# Patient Record
Sex: MALE | Race: ASIAN | NOT HISPANIC OR LATINO | Employment: OTHER | URBAN - METROPOLITAN AREA
[De-identification: names, ages, dates, MRNs, and addresses within clinical notes are randomized per-mention and may not be internally consistent; named-entity substitution may affect disease eponyms.]

---

## 2018-06-04 ENCOUNTER — OFFICE VISIT (OUTPATIENT)
Dept: PEDIATRICS | Facility: CLINIC | Age: 28
End: 2018-06-04
Payer: COMMERCIAL

## 2018-06-04 VITALS
OXYGEN SATURATION: 96 % | BODY MASS INDEX: 28.9 KG/M2 | DIASTOLIC BLOOD PRESSURE: 76 MMHG | TEMPERATURE: 97.5 F | HEIGHT: 68 IN | WEIGHT: 190.7 LBS | SYSTOLIC BLOOD PRESSURE: 110 MMHG | HEART RATE: 94 BPM

## 2018-06-04 DIAGNOSIS — H61.22 IMPACTED CERUMEN OF LEFT EAR: Primary | ICD-10-CM

## 2018-06-04 PROCEDURE — 99203 OFFICE O/P NEW LOW 30 MIN: CPT | Performed by: PHYSICIAN ASSISTANT

## 2018-06-04 NOTE — PROGRESS NOTES
"  SUBJECTIVE:   Andreas Araujo is a 28 year old male who presents to clinic today for the following health issues:    Was cleaning ear with a q-tip and ear immediatly felt plugged. States he can not hear out of left ear. Has since tried peroxide.     No ear pain/discomfort. No fevers, chills. No nasal congestion or history of allergies.     ROS:  ROS otherwise negative    OBJECTIVE:                                                    /76 (BP Location: Right arm, Cuff Size: Adult Regular)  Pulse 94  Temp 97.5  F (36.4  C) (Oral)  Ht 5' 8\" (1.727 m)  Wt 190 lb 11.2 oz (86.5 kg)  SpO2 96%  BMI 29 kg/m2  Body mass index is 29 kg/(m^2).   GENERAL: alert, no distress  HENT: ear canals- cerumen impaction on left; wnl on right; Nose- normal; Mouth- no ulcers, no lesions  NECK: no tenderness, no adenopathy  Diagnostic test results:  No results found for this or any previous visit (from the past 24 hour(s)).     ASSESSMENT/PLAN:                                                    (H61.22) Impacted cerumen of left ear  (primary encounter diagnosis)  Comment: removed with success by ear irrigation.   Plan:     Mike Quiñones PA-C  [facility] [place]  "

## 2018-06-04 NOTE — MR AVS SNAPSHOT
"              After Visit Summary   6/4/2018    Andreas Araujo    MRN: [id number]           Patient Information     Date Of Birth          1990        Visit Information        Provider Department      6/4/2018 1:10 PM Mike Quiñones PA-C [facility]        Today's Diagnoses     Impacted cerumen of left ear    -  1       Follow-ups after your visit        Who to contact     If you have questions or need follow up information about today's clinic visit or your schedule please contact [facility] directly at [phone].  Normal or non-critical lab and imaging results will be communicated to you by MyChart, letter or phone within 4 business days after the clinic has received the results. If you do not hear from us within 7 days, please contact the clinic through MyChart or phone. If you have a critical or abnormal lab result, we will notify you by phone as soon as possible.  Submit refill requests through [employer] or call your pharmacy and they will forward the refill request to us. Please allow 3 business days for your refill to be completed.          Additional Information About Your Visit        Care EveryWhere ID     This is your Care EveryWhere ID. This could be used by other organizations to access your [facility] medical records  [id number]        Your Vitals Were     Pulse Temperature Height Pulse Oximetry BMI (Body Mass Index)       94 97.5  F (36.4  C) (Oral) 5' 8\" (1.727 m) 96% 29 kg/m2        Blood Pressure from Last 3 Encounters:   06/04/18 110/76   08/13/16 109/52   07/25/03 112/68    Weight from Last 3 Encounters:   06/04/18 190 lb 11.2 oz (86.5 kg)   07/25/03 114 lb (51.7 kg) (82 %)*   06/17/03 112 lb (50.8 kg) (82 %)*     * Growth percentiles are based on CDC 2-20 Years data.              Today, you had the following     No orders found for display       Primary Care Provider Office Phone # Fax #    [facility] [phone] " [facility] [address]        Equal Access to Services     [facility] CAROLYN : Hadii aad ku haddawsonmary ellen [place], waviancada otto, qawesta curlymaclarke tapia. So [place] [phone].    ATENCIÓN: Si habla español, tiene a musa disposición servicios gratuitos de asistencia lingüística. [facility] [phone].    We comply with applicable federal civil rights laws and [place] laws. We do not discriminate on the basis of race, color, national origin, age, disability, sex, sexual orientation, or gender identity.            Thank you!     Thank you for choosing [facility]  for your care. Our goal is always to provide you with excellent care. Hearing back from our patients is one way we can continue to improve our services. Please take a few minutes to complete the written survey that you may receive in the mail after your visit with us. Thank you!             Your Updated Medication List - Protect others around you: Learn how to safely use, store and throw away your medicines at www.disposemymeds.org.          This list is accurate as of 6/4/18  1:54 PM.  Always use your most recent med list.                   Brand Name Dispense Instructions for use Diagnosis    ADVAIR DISKUS 250-50 MCG/DOSE diskus inhaler   Generic drug:  fluticasone-salmeterol     1    i inhalation bid        albuterol 90 MCG/ACT inhaler     1    1-2 puffs Q 4-6 hrs prn

## 2019-09-19 ENCOUNTER — OFFICE VISIT (OUTPATIENT)
Dept: PEDIATRICS | Facility: CLINIC | Age: 29
End: 2019-09-19
Payer: COMMERCIAL

## 2019-09-19 VITALS
DIASTOLIC BLOOD PRESSURE: 80 MMHG | HEART RATE: 83 BPM | BODY MASS INDEX: 28.11 KG/M2 | OXYGEN SATURATION: 94 % | WEIGHT: 189.8 LBS | SYSTOLIC BLOOD PRESSURE: 108 MMHG | TEMPERATURE: 97.8 F | HEIGHT: 69 IN | RESPIRATION RATE: 18 BRPM

## 2019-09-19 DIAGNOSIS — R73.03 PRE-DIABETES: Primary | ICD-10-CM

## 2019-09-19 DIAGNOSIS — Z86.018 HISTORY OF PROLACTINOMA: ICD-10-CM

## 2019-09-19 DIAGNOSIS — K76.0 NAFLD (NONALCOHOLIC FATTY LIVER DISEASE): ICD-10-CM

## 2019-09-19 LAB
HBA1C MFR BLD: 5.6 % (ref 0–5.6)
PROLACTIN SERPL-MCNC: 5 UG/L (ref 2–18)

## 2019-09-19 PROCEDURE — 99214 OFFICE O/P EST MOD 30 MIN: CPT | Mod: GC | Performed by: STUDENT IN AN ORGANIZED HEALTH CARE EDUCATION/TRAINING PROGRAM

## 2019-09-19 PROCEDURE — 83036 HEMOGLOBIN GLYCOSYLATED A1C: CPT | Performed by: INTERNAL MEDICINE

## 2019-09-19 PROCEDURE — 84146 ASSAY OF PROLACTIN: CPT | Performed by: INTERNAL MEDICINE

## 2019-09-19 PROCEDURE — 36415 COLL VENOUS BLD VENIPUNCTURE: CPT | Performed by: INTERNAL MEDICINE

## 2019-09-19 PROCEDURE — 80061 LIPID PANEL: CPT | Performed by: INTERNAL MEDICINE

## 2019-09-19 PROCEDURE — 80053 COMPREHEN METABOLIC PANEL: CPT | Performed by: INTERNAL MEDICINE

## 2019-09-19 ASSESSMENT — MIFFLIN-ST. JEOR: SCORE: 1813.37

## 2019-09-19 NOTE — LETTER
"               [facility]  [address]  [place] [address]                  [phone]   September 23, 2019    Andreas Araujo  [address]   [place] [address]      Dear Andreas,    Here is a summary of your recent test results:    I am managing results for Dr. Hahn while he is out of the office. Excellent news -- your lab tests are much better!     1. Your LDL or \"bad\" cholesterol is just mildly elevated. Often this is genetic, but continuing to work on exercise and eating a diet that is lower in animal fats can help bring this down further.     2. Your liver and kidney function tests are all now in the normal range. Your electrolytes look good too.     3. Your hemoglobin A1c, which screens for diabetes, is now in the normal range. Great work!     4. Your prolactin is also normal. If you continue to have issues with pressure behind your eyes, or this gets worse, please let us know or consider following up with your Endocrinologist to see if you need a repeat brain MRI. It would be ideal to either do this at the same location or have your results sent over if we need to do an MRI so that we can compare the new images to the old ones.     Please let me know if you have any concerns or questions.     Thank you very much for choosing [facility]            Your test results are enclosed.      Best regards,     Phyllis Hermosillo MD   Internal Medicine-Pediatrics                        Results for orders placed or performed in visit on 09/19/19   Comprehensive metabolic panel (BMP + Alb, Alk Phos, ALT, AST, Total. Bili, TP)   Result Value Ref Range    Sodium 138 133 - 144 mmol/L    Potassium 4.1 3.4 - 5.3 mmol/L    Chloride 106 94 - 109 mmol/L    Carbon Dioxide 25 20 - 32 mmol/L    Anion Gap 7 3 - 14 mmol/L    Glucose 84 70 - 99 mg/dL    Urea Nitrogen 16 7 - 30 mg/dL    Creatinine 0.96 0.66 - 1.25 mg/dL    GFR Estimate >90 >60 mL/min/[1.73_m2]    GFR Estimate If Black >90 " >60 mL/min/[1.73_m2]    Calcium 9.1 8.5 - 10.1 mg/dL    Bilirubin Total 0.4 0.2 - 1.3 mg/dL    Albumin 4.1 3.4 - 5.0 g/dL    Protein Total 7.9 6.8 - 8.8 g/dL    Alkaline Phosphatase 104 40 - 150 U/L    ALT 53 0 - 70 U/L    AST 25 0 - 45 U/L   Lipid panel reflex to direct LDL Fasting   Result Value Ref Range    Cholesterol 156 <200 mg/dL    Triglycerides 88 <150 mg/dL    HDL Cholesterol 28 (L) >39 mg/dL    LDL Cholesterol Calculated 110 (H) <100 mg/dL    Non HDL Cholesterol 128 <130 mg/dL   Hemoglobin A1c   Result Value Ref Range    Hemoglobin A1C 5.6 0 - 5.6 %   Prolactin   Result Value Ref Range    Prolactin 5 2 - 18 ug/L

## 2019-09-19 NOTE — PATIENT INSTRUCTIONS
Avoid high starch foods - rice, breads, pasta etc. To help lower triglycerides  Continue to make all your own meals using fresh food as possible  Aim for 5 servings of vegetables a day  If your eye pressure does not improve, or you develop symptoms of breast swelling/milky discharge, or persistent ED follow up with the endocrinologist you saw before in [place]

## 2019-09-19 NOTE — PROGRESS NOTES
Subjective      Patient is a 29-year-old male with past medical history notable for adolescent asthma currently not on any medications well-controlled, as well as history of prolactinoma previously managed on dopaminergic agents, not currently on medications who is coming in for metabolic abnormalities noted on lab work taken at an outside facility due to application for life insurance in January 2019.    Patient brought in lab work which is notable for a fasting glucose of 106, a hemoglobin A1c of 6.3, ALT in the 60s, with AST in the 30s, as well as GGT in the 60s.  Fasting lipids were notable for triglycerides elevated to the 190s, as well as .  Creatinine was 1.0.    He otherwise endorses a morning posterior eye pressure that he experiences upon waking, similar to what he experienced when he was last diagnosed with a prolactinoma.  However at that time he was suffering from erectile dysfunction, which he has not had this time.  He is able to have morning erections.  And is usually able to maintain erection during sexual intercourse.  Does note that on stressful days he is unable to.  He has not had any changes to vision, photophobia, peripheral field cuts, focal neurological defects, fever sweats or chills.    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    Since receiving the lab work noted above he has increased his exercise to 2 miles of walking 4-5 times a week on a treadmill with a 15% grade, as well as the addition of resistance training using weights.      No known hx of family of cardiovascular disease, nor diabetes or oncologic history.      There is no problem list on file for this patient.    Past Surgical History:   Procedure Laterality Date     C NONSPECIFIC PROCEDURE      oral  "surgery       Social History     Tobacco Use     Smoking status: Never Smoker     Smokeless tobacco: Never Used     Tobacco comment: no second hand smoke exposure   Substance Use Topics     Alcohol use: Yes     Comment: \" i drink a few here and there\"     Family History   Problem Relation Age of Onset     Family History Negative No family hx of          Reviewed and updated as needed this visit by Provider         Review of Systems   ROS COMP: CONSTITUTIONAL: NEGATIVE for fever, chills, change in weight  INTEGUMENTARY/SKIN: NEGATIVE for worrisome rashes, moles or lesions  EYES:  SEE HPI  ENT/MOUTH: NEGATIVE for ear, mouth and throat problems  RESP: NEGATIVE for significant cough or SOB  BREAST: NEGATIVE for masses, tenderness or discharge  CV: NEGATIVE for chest pain, palpitations or peripheral edema  GI: NEGATIVE for nausea, abdominal pain, heartburn, or change in bowel habits  : NEGATIVE for frequency, dysuria, or hematuria  MUSCULOSKELETAL: NEGATIVE for significant arthralgias or myalgia  NEURO: NEGATIVE for weakness, dizziness or paresthesias  HEME: NEGATIVE for bleeding problems  PSYCHIATRIC: NEGATIVE for changes in mood or affect      Objective    /80   Pulse 83   Temp 97.8  F (36.6  C) (Oral)   Resp 18   Ht 1.74 m (5' 8.5\")   Wt 86.1 kg (189 lb 12.8 oz)   SpO2 94%   BMI 28.44 kg/m    Body mass index is 28.44 kg/m .  Physical Exam   GENERAL: healthy, alert and no distress  EYES: Visual fields intact to confrontation, extraocular eye movements grossly intact, however on conjugate downward gaze patient demonstrates a right eye drift out from midline, this corrects with occlusion of left gaze.  Pupils are equal and reactive to light.  On exam no clear appreciation of papilledema however this exam is limited by providers proficiency  HENT: ear canals and TM's normal, nose and mouth without ulcers or lesions  NECK: no adenopathy, no asymmetry, masses, or scars and thyroid normal to palpation  RESP: " lungs clear to auscultation - no rales, rhonchi or wheezes  CV: regular rate and rhythm, normal S1 S2, no S3 or S4, no murmur, click or rub, no peripheral edema and peripheral pulses strong  ABDOMEN: soft, nontender, no hepatosplenomegaly, no masses and bowel sounds normal  MS: no gross musculoskeletal defects noted, no edema  SKIN: no suspicious lesions or rashes  NEURO: Normal strength and tone, mentation intact and speech normal  PSYCH: mentation appears normal, affect normal/bright    Diagnostic Test Results:  Pending        Assessment & Plan     (R73.03) Pre-diabetes  (primary encounter diagnosis)  Comment: Patient presents with outside lab work consistent with a prediabetic picture including a fasting blood glucose of 106 within the range of 100-1 25, as well as a hemoglobin A1c of 6.3.  Patient notes that he has changed his diet since then, and given his young age as well as reported dietary changes with rechecking these values at this time.  Fortunately patient is fasting today.  Patient was counseled on exercise and dietary habits to help with this.  Hope to avoid any need for medication at this time.  However this problem is consistent with this further issues on lab work consistent with the metabolic derangement concerning for possible metabolic syndrome.    Plan:    Comprehensive metabolic panel (BMP + Alb, Alk Phos, ALT, AST, Total. Bili, TP),    Lipid panel  reflex to direct LDL Fasting,    Hemoglobin A1c        -If continues to have elevated hemoglobin A1c and fasting glucose following dietary interventions consider further counseling, possibly meeting with diabetic educator, encouraging further alcohol cessation, and still unsuccessful considering obtaining baseline UA (however patient's creatinine last known was normal) and if very necessary consider starting medications    (K76.0) NAFLD (nonalcoholic fatty liver disease)/DURON (nonalcoholic steatohepatitis)  Comment: Patient's hepatic profile of ALT  "twice that of AST with GGT elevation is consistent with Crocker/Naftel, given his young age and assumed relatively early progression of such a disease hope for possible reversal of this given lifestyle and dietary changes.  Also given his elevated triglycerides increased risk for this.  Patient encouraged to stay away from starches and rice, as well as dietary and exercise changes made above.  No known family history of liver disease, patient does not endorse any abdominal symptoms, and after testing demonstrated that he was hep B and hep C negative.  Patient does not endorse significant Tylenol use.    Plan: Comprehensive metabolic panel (BMP + Alb, Alk Phos, ALT, AST, Total. Bili, TP),    Lipid panel  reflex to direct LDL Fasting,        - dietary and lifestyle changes    (Z86.018) History of prolactinoma  Comment: Patient reports a history of a prolactinoma, which which he previously saw an endocrinologist for, and was managed on cabergoline with resolution of the symptoms most notably erectile dysfunction.  Patient does report that his eye pressures may be similar to what he had felt before, however is not endorsing symptoms of erectile dysfunction, gynecomastia, galactorrhea, testicular shrinkage, nor vision changes.  Is recommended the patient monitor symptoms and reach out to his prior endocrinologist as they have a history of his previous MRI scans to further assess if there is any changes.  [place] obtaining a prolactin level at this time.    Plan: Prolactin        - potential f/u with his prior endocrinologist should prolactin return elevated or he develop new symptoms consistent with prolactinoma (pt was educated on these symptoms)       BMI:   Estimated body mass index is 28.44 kg/m  as calculated from the following:    Height as of this encounter: 1.74 m (5' 8.5\").    Weight as of this encounter: 86.1 kg (189 lb 12.8 oz).   Weight management plan: Discussed healthy diet and exercise guidelines    Cale " Frandy Hahn MD  [facility] [place]    I have seen and discussed the patient with Dr. Hahn and agree with the jointly developed plan as documented above. Repeating labs as above, if prolactin normal and headaches persist would recommend that he follow-up with Endocrinologist or us for head MRI to make sure that he does not have a growing non-active pituitary mass. If prolactin elevated, he will need to follow-up with Endo.     Phyllis Hermosillo MD  Internal Medicine-Pediatrics

## 2019-09-20 LAB
ALBUMIN SERPL-MCNC: 4.1 G/DL (ref 3.4–5)
ALP SERPL-CCNC: 104 U/L (ref 40–150)
ALT SERPL W P-5'-P-CCNC: 53 U/L (ref 0–70)
ANION GAP SERPL CALCULATED.3IONS-SCNC: 7 MMOL/L (ref 3–14)
AST SERPL W P-5'-P-CCNC: 25 U/L (ref 0–45)
BILIRUB SERPL-MCNC: 0.4 MG/DL (ref 0.2–1.3)
BUN SERPL-MCNC: 16 MG/DL (ref 7–30)
CALCIUM SERPL-MCNC: 9.1 MG/DL (ref 8.5–10.1)
CHLORIDE SERPL-SCNC: 106 MMOL/L (ref 94–109)
CHOLEST SERPL-MCNC: 156 MG/DL
CO2 SERPL-SCNC: 25 MMOL/L (ref 20–32)
CREAT SERPL-MCNC: 0.96 MG/DL (ref 0.66–1.25)
GFR SERPL CREATININE-BSD FRML MDRD: >90 ML/MIN/{1.73_M2}
GLUCOSE SERPL-MCNC: 84 MG/DL (ref 70–99)
HDLC SERPL-MCNC: 28 MG/DL
LDLC SERPL CALC-MCNC: 110 MG/DL
NONHDLC SERPL-MCNC: 128 MG/DL
POTASSIUM SERPL-SCNC: 4.1 MMOL/L (ref 3.4–5.3)
PROT SERPL-MCNC: 7.9 G/DL (ref 6.8–8.8)
SODIUM SERPL-SCNC: 138 MMOL/L (ref 133–144)
TRIGL SERPL-MCNC: 88 MG/DL

## 2021-03-14 ENCOUNTER — HEALTH MAINTENANCE LETTER (OUTPATIENT)
Age: 31
End: 2021-03-14

## 2021-03-22 ENCOUNTER — IMMUNIZATION (OUTPATIENT)
Dept: NURSING | Facility: CLINIC | Age: 31
End: 2021-03-22
Payer: COMMERCIAL

## 2021-03-22 PROCEDURE — 91300 PR COVID VAC PFIZER DIL RECON 30 MCG/0.3 ML IM: CPT

## 2021-03-22 PROCEDURE — 0001A PR COVID VAC PFIZER DIL RECON 30 MCG/0.3 ML IM: CPT

## 2021-04-12 ENCOUNTER — IMMUNIZATION (OUTPATIENT)
Dept: NURSING | Facility: CLINIC | Age: 31
End: 2021-04-12
Attending: FAMILY MEDICINE
Payer: COMMERCIAL

## 2021-04-12 PROCEDURE — 91300 PR COVID VAC PFIZER DIL RECON 30 MCG/0.3 ML IM: CPT

## 2021-04-12 PROCEDURE — 0002A PR COVID VAC PFIZER DIL RECON 30 MCG/0.3 ML IM: CPT

## 2021-10-23 ENCOUNTER — HEALTH MAINTENANCE LETTER (OUTPATIENT)
Age: 31
End: 2021-10-23

## 2022-04-09 ENCOUNTER — HEALTH MAINTENANCE LETTER (OUTPATIENT)
Age: 32
End: 2022-04-09

## 2022-10-10 ENCOUNTER — HEALTH MAINTENANCE LETTER (OUTPATIENT)
Age: 32
End: 2022-10-10

## 2023-05-27 ENCOUNTER — HEALTH MAINTENANCE LETTER (OUTPATIENT)
Age: 33
End: 2023-05-27

## 2023-05-29 ASSESSMENT — ENCOUNTER SYMPTOMS
NAUSEA: 0
HEMATURIA: 0
CHILLS: 0
DIZZINESS: 0
SHORTNESS OF BREATH: 0
FEVER: 0
COUGH: 0
HEMATOCHEZIA: 0
MYALGIAS: 0
CONSTIPATION: 0
JOINT SWELLING: 0
WEAKNESS: 0
ARTHRALGIAS: 0
DIARRHEA: 0
SORE THROAT: 0
HEARTBURN: 0
FREQUENCY: 0
EYE PAIN: 0
DYSURIA: 0
HEADACHES: 0
PARESTHESIAS: 0
NERVOUS/ANXIOUS: 0
ABDOMINAL PAIN: 0
PALPITATIONS: 0

## 2023-06-05 ENCOUNTER — OFFICE VISIT (OUTPATIENT)
Dept: INTERNAL MEDICINE | Facility: CLINIC | Age: 33
End: 2023-06-05
Payer: COMMERCIAL

## 2023-06-05 VITALS
OXYGEN SATURATION: 100 % | SYSTOLIC BLOOD PRESSURE: 122 MMHG | TEMPERATURE: 97.4 F | HEIGHT: 69 IN | DIASTOLIC BLOOD PRESSURE: 85 MMHG | WEIGHT: 188.7 LBS | HEART RATE: 78 BPM | BODY MASS INDEX: 27.95 KG/M2

## 2023-06-05 DIAGNOSIS — Z86.39 HISTORY OF HYPERPROLACTINEMIA: ICD-10-CM

## 2023-06-05 DIAGNOSIS — N52.9 ERECTILE DYSFUNCTION, UNSPECIFIED ERECTILE DYSFUNCTION TYPE: ICD-10-CM

## 2023-06-05 DIAGNOSIS — R73.03 PRE-DIABETES: ICD-10-CM

## 2023-06-05 DIAGNOSIS — R06.83 SNORING: ICD-10-CM

## 2023-06-05 DIAGNOSIS — Z11.4 SCREENING FOR HIV (HUMAN IMMUNODEFICIENCY VIRUS): ICD-10-CM

## 2023-06-05 DIAGNOSIS — R39.9 LOWER URINARY TRACT SYMPTOMS: ICD-10-CM

## 2023-06-05 DIAGNOSIS — Z11.59 NEED FOR HEPATITIS C SCREENING TEST: ICD-10-CM

## 2023-06-05 DIAGNOSIS — Z00.00 ROUTINE GENERAL MEDICAL EXAMINATION AT A HEALTH CARE FACILITY: Primary | ICD-10-CM

## 2023-06-05 DIAGNOSIS — Z13.220 LIPID SCREENING: ICD-10-CM

## 2023-06-05 LAB
ALBUMIN SERPL BCG-MCNC: 4.8 G/DL (ref 3.5–5.2)
ALBUMIN UR-MCNC: NEGATIVE MG/DL
ALP SERPL-CCNC: 92 U/L (ref 40–129)
ALT SERPL W P-5'-P-CCNC: 43 U/L (ref 10–50)
ANION GAP SERPL CALCULATED.3IONS-SCNC: 11 MMOL/L (ref 7–15)
APPEARANCE UR: CLEAR
AST SERPL W P-5'-P-CCNC: 40 U/L (ref 10–50)
BACTERIA #/AREA URNS HPF: NORMAL /HPF
BILIRUB SERPL-MCNC: 0.4 MG/DL
BILIRUB UR QL STRIP: NEGATIVE
BUN SERPL-MCNC: 14.2 MG/DL (ref 6–20)
CALCIUM SERPL-MCNC: 9.8 MG/DL (ref 8.6–10)
CHLORIDE SERPL-SCNC: 103 MMOL/L (ref 98–107)
COLOR UR AUTO: YELLOW
CREAT SERPL-MCNC: 0.99 MG/DL (ref 0.67–1.17)
DEPRECATED HCO3 PLAS-SCNC: 24 MMOL/L (ref 22–29)
ERYTHROCYTE [DISTWIDTH] IN BLOOD BY AUTOMATED COUNT: 11.8 % (ref 10–15)
GFR SERPL CREATININE-BSD FRML MDRD: >90 ML/MIN/1.73M2
GLUCOSE SERPL-MCNC: 112 MG/DL (ref 70–99)
GLUCOSE UR STRIP-MCNC: NEGATIVE MG/DL
HBA1C MFR BLD: 5.7 % (ref 0–5.6)
HCT VFR BLD AUTO: 46.3 % (ref 40–53)
HCV AB SERPL QL IA: NONREACTIVE
HGB BLD-MCNC: 15.4 G/DL (ref 13.3–17.7)
HGB UR QL STRIP: NEGATIVE
HIV 1+2 AB+HIV1 P24 AG SERPL QL IA: NONREACTIVE
KETONES UR STRIP-MCNC: NEGATIVE MG/DL
LEUKOCYTE ESTERASE UR QL STRIP: NEGATIVE
MCH RBC QN AUTO: 29.9 PG (ref 26.5–33)
MCHC RBC AUTO-ENTMCNC: 33.3 G/DL (ref 31.5–36.5)
MCV RBC AUTO: 90 FL (ref 78–100)
NITRATE UR QL: NEGATIVE
PH UR STRIP: 6 [PH] (ref 5–7)
PLATELET # BLD AUTO: 292 10E3/UL (ref 150–450)
POTASSIUM SERPL-SCNC: 4.4 MMOL/L (ref 3.4–5.3)
PROLACTIN SERPL 3RD IS-MCNC: 18 NG/ML (ref 4–15)
PROT SERPL-MCNC: 8.1 G/DL (ref 6.4–8.3)
RBC # BLD AUTO: 5.15 10E6/UL (ref 4.4–5.9)
RBC #/AREA URNS AUTO: NORMAL /HPF
SHBG SERPL-SCNC: 9 NMOL/L (ref 11–80)
SODIUM SERPL-SCNC: 138 MMOL/L (ref 136–145)
SP GR UR STRIP: 1.01 (ref 1–1.03)
UROBILINOGEN UR STRIP-ACNC: 0.2 E.U./DL
WBC # BLD AUTO: 8.5 10E3/UL (ref 4–11)
WBC #/AREA URNS AUTO: NORMAL /HPF

## 2023-06-05 PROCEDURE — 99213 OFFICE O/P EST LOW 20 MIN: CPT | Mod: 25

## 2023-06-05 PROCEDURE — 36415 COLL VENOUS BLD VENIPUNCTURE: CPT

## 2023-06-05 PROCEDURE — 84403 ASSAY OF TOTAL TESTOSTERONE: CPT

## 2023-06-05 PROCEDURE — 81001 URINALYSIS AUTO W/SCOPE: CPT

## 2023-06-05 PROCEDURE — 99385 PREV VISIT NEW AGE 18-39: CPT | Mod: 25

## 2023-06-05 PROCEDURE — 90715 TDAP VACCINE 7 YRS/> IM: CPT

## 2023-06-05 PROCEDURE — 84270 ASSAY OF SEX HORMONE GLOBUL: CPT

## 2023-06-05 PROCEDURE — 84146 ASSAY OF PROLACTIN: CPT

## 2023-06-05 PROCEDURE — 87389 HIV-1 AG W/HIV-1&-2 AB AG IA: CPT

## 2023-06-05 PROCEDURE — 90471 IMMUNIZATION ADMIN: CPT

## 2023-06-05 PROCEDURE — 80053 COMPREHEN METABOLIC PANEL: CPT

## 2023-06-05 PROCEDURE — 85027 COMPLETE CBC AUTOMATED: CPT

## 2023-06-05 PROCEDURE — 86803 HEPATITIS C AB TEST: CPT

## 2023-06-05 PROCEDURE — 83036 HEMOGLOBIN GLYCOSYLATED A1C: CPT

## 2023-06-05 ASSESSMENT — ENCOUNTER SYMPTOMS
CHILLS: 0
ABDOMINAL PAIN: 0
HEARTBURN: 0
NAUSEA: 0
HEADACHES: 0
SORE THROAT: 0
NERVOUS/ANXIOUS: 0
PALPITATIONS: 0
ARTHRALGIAS: 0
JOINT SWELLING: 0
HEMATURIA: 0
WEAKNESS: 0
DIARRHEA: 0
FREQUENCY: 0
CONSTIPATION: 0
FEVER: 0
DIZZINESS: 0
MYALGIAS: 0
DYSURIA: 0
PARESTHESIAS: 0
SHORTNESS OF BREATH: 0
HEMATOCHEZIA: 0
COUGH: 0
EYE PAIN: 0

## 2023-06-05 NOTE — PROGRESS NOTES
SUBJECTIVE:   CC: Andreas is an 33 year old who presents for preventative health visit.       6/5/2023     7:48 AM   Additional Questions   Roomed by Abigail Vo MA   Accompanied by Himself     Healthy Habits:     Getting at least 3 servings of Calcium per day:  NO    Bi-annual eye exam:  NO    Dental care twice a year:  Yes    Sleep apnea or symptoms of sleep apnea:  None    Diet:  Regular (no restrictions) and Carbohydrate counting    Frequency of exercise:  4-5 days/week    Duration of exercise:  15-30 minutes    Taking medications regularly:  Yes    Medication side effects:  Not applicable    PHQ-2 Total Score: 0    Additional concerns today:  Yes    Ability to successfully perform activities of daily living: No, needs assistance with: no assistance needed  Home safety:  none identified   Hearing impairment: No, No concerns      Today's PHQ-2 Score:       6/4/2023    12:24 PM   PHQ-2 ( 1999 [employer])   Q1: Little interest or pleasure in doing things 0   Q2: Feeling down, depressed or hopeless 0   PHQ-2 Score 0   Q1: Little interest or pleasure in doing things Not at all   Q2: Feeling down, depressed or hopeless Not at all   PHQ-2 Score 0       Have you ever done Advance Care Planning? (For example, a Health Directive, POLST, or a discussion with a medical provider or your loved ones about your wishes): No, advance care planning information given to patient to review.  Advanced care planning was discussed at today's visit.    Social History     Tobacco Use     Smoking status: Never     Smokeless tobacco: Never     Tobacco comments:     no second hand smoke exposure   Vaping Use     Vaping status: Never Used   Substance Use Topics     Alcohol use: Yes     Comment: 1-5 times a year           5/29/2023    11:54 AM   Alcohol Use   Prescreen: >3 drinks/day or >7 drinks/week? No          View : No data to display.                Last PSA: No results found for: PSA    Reviewed orders with patient. Reviewed health  maintenance and updated orders accordingly - Yes    Reviewed and updated as needed this visit by clinical staff   Tobacco  Allergies  Meds              Reviewed and updated as needed this visit by Provider                 Past Medical History:   Diagnosis Date     Uncomplicated asthma     As a child     Unspecified asthma(493.90)      Vomiting alone       Past Surgical History:   Procedure Laterality Date     RUST NONSPECIFIC PROCEDURE      oral surgery       Review of Systems   Constitutional: Negative for chills and fever.   HENT: Negative for congestion, ear pain, hearing loss and sore throat.    Eyes: Negative for pain and visual disturbance.   Respiratory: Negative for cough and shortness of breath.    Cardiovascular: Negative for chest pain, palpitations and peripheral edema.   Gastrointestinal: Negative for abdominal pain, constipation, diarrhea, heartburn, hematochezia and nausea.   Genitourinary: Negative for dysuria, frequency, genital sores, hematuria, impotence, penile discharge and urgency.   Musculoskeletal: Negative for arthralgias, joint swelling and myalgias.   Skin: Negative for rash.   Neurological: Negative for dizziness, weakness, headaches and paresthesias.   Psychiatric/Behavioral: Negative for mood changes. The patient is not nervous/anxious.      3 years ago had blood work    Had hyperprolactinemia- was having ED at age 22 and this is how it was found out     When he urinates it is foamy- when he flushes it is fine. He does not have any pain with urination or urinary discharge.    Elevated liver enzymes- never had US     Chiropractor as occupation     Patient's wife has genital herpes.  He states that he has been tested for this before and it was positive.  He states he has also had lesions in the past.  We discussed testing blood for herpes but overall we decided that we would not do it.  Advised patient that if he does have lesions he should come in to clinic to test them and we can use  "antiviral to treat    OBJECTIVE:   /85 (BP Location: Left arm, Patient Position: Sitting, Cuff Size: Adult Large)   Pulse 78   Temp 97.4  F (36.3  C) (Oral)   Ht 1.753 m (5' 9\")   Wt 85.6 kg (188 lb 11.2 oz)   SpO2 100%   BMI 27.87 kg/m      Physical Exam  GENERAL: alert and no distress  EYES: Eyes grossly normal to inspection  HENT: ear canals and TM's normal, nose and mouth without ulcers or lesions  NECK: no adenopathy, no asymmetry, masses, or scars and thyroid normal to palpation  RESP: lungs clear to auscultation - no rales, rhonchi or wheezes  CV: regular rate and rhythm, normal S1 S2, no S3 or S4, no murmur, click or rub, no peripheral edema and peripheral pulses strong  ABDOMEN: soft, nontender, no hepatosplenomegaly, no masses and bowel sounds normal  MS: no gross musculoskeletal defects noted, no edema  SKIN: no suspicious lesions or rashes  NEURO: Normal strength and tone, mentation intact and speech normal  PSYCH: mentation appears normal, affect normal/bright      ASSESSMENT/PLAN:   (Z00.00) Routine general medical examination at a health care facility  (primary encounter diagnosis)  Comment: routine- few additional concerns  Plan: Comprehensive metabolic panel (BMP + Alb, Alk         Phos, ALT, AST, Total. Bili, TP), CBC with         platelets            (Z11.4) Screening for HIV (human immunodeficiency virus)  Comment: screening  Plan: HIV Antigen Antibody Combo            (Z11.59) Need for hepatitis C screening test  Comment: screening  Plan: Hepatitis C Screen Reflex to HCV RNA Quant and         Genotype            (Z13.220) Lipid screening  Comment: screening  Plan:     (R73.03) Pre-diabetes  Comment: A1c was previously in pre-diabetic range per patient report.   Plan: Hemoglobin A1c            (Z86.39) History of hyperprolactinemia  Comment: per patient report. He had also been deficient in testosterone.   Plan: Prolactin, Testosterone Free and Total            (N52.9) Erectile " "dysfunction, unspecified erectile dysfunction type  Comment: has improved from when he was younger. Interested in to continue to monitor  Plan: Testosterone Free and Total            (R39.9) Lower urinary tract symptoms  Comment: patient states that urine seems more foamy that normal. He is concerned that there is protein in his urine. Will check UA  Plan: UA with Microscopic reflex to Culture - lab         collect            (R06.83) Snoring  Comment: Patient states that he snores loudly and his wife says that he has some breathing failure during sleep.  Plan: Adult Sleep Eval & Management          Referral            COUNSELING:   Reviewed preventive health counseling, as reflected in patient instructions      BMI:   Estimated body mass index is 27.87 kg/m  as calculated from the following:    Height as of this encounter: 1.753 m (5' 9\").    Weight as of this encounter: 85.6 kg (188 lb 11.2 oz).       He reports that he has never smoked. He has never used smokeless tobacco.      Matty Garcia NP  [facility]  "

## 2023-06-05 NOTE — NURSING NOTE
Prior to immunization administration, verified patients identity using patient s name and date of birth. Please see Immunization Activity for additional information.     Screening Questionnaire for Adult Immunization    Are you sick today?   No   Do you have allergies to medications, food, a vaccine component or latex?   No   Have you ever had a serious reaction after receiving a vaccination?   No   Do you have a long-term health problem with heart, lung, kidney, or metabolic disease (e.g., diabetes), asthma, a blood disorder, no spleen, complement component deficiency, a cochlear implant, or a spinal fluid leak?  Are you on long-term aspirin therapy?   No   Do you have cancer, leukemia, HIV/AIDS, or any other immune system problem?   No   Do you have a parent, brother, or sister with an immune system problem?   No   In the past 3 months, have you taken medications that affect  your immune system, such as prednisone, other steroids, or anticancer drugs; drugs for the treatment of rheumatoid arthritis, Crohn s disease, or psoriasis; or have you had radiation treatments?   No   Have you had a seizure, or a brain or other nervous system problem?   No   During the past year, have you received a transfusion of blood or blood    products, or been given immune (gamma) globulin or antiviral drug?   No   For women: Are you pregnant or is there a chance you could become       pregnant during the next month?   No   Have you received any vaccinations in the past 4 weeks?   No     Immunization questionnaire answers were all negative.      Injection of TDAP given by Abigail Vo MA. Patient instructed to remain in clinic for 15 minutes afterwards, and to report any adverse reactions.     Screening performed by Abigail Vo MA on 6/5/2023 at 8:36 AM.

## 2023-06-07 ENCOUNTER — MYC MEDICAL ADVICE (OUTPATIENT)
Dept: INTERNAL MEDICINE | Facility: CLINIC | Age: 33
End: 2023-06-07
Payer: COMMERCIAL

## 2023-06-07 DIAGNOSIS — R73.09 ELEVATED GLUCOSE: ICD-10-CM

## 2023-06-07 DIAGNOSIS — Z00.00 ROUTINE GENERAL MEDICAL EXAMINATION AT A HEALTH CARE FACILITY: Primary | ICD-10-CM

## 2023-06-07 LAB
TESTOST FREE SERPL-MCNC: 11.56 NG/DL
TESTOST SERPL-MCNC: 345 NG/DL (ref 240–950)

## 2023-06-08 DIAGNOSIS — Z13.220 LIPID SCREENING: ICD-10-CM

## 2023-06-08 DIAGNOSIS — Z86.39 HISTORY OF HYPERPROLACTINEMIA: ICD-10-CM

## 2023-06-08 DIAGNOSIS — E22.1 HYPERPROLACTINEMIA (H): Primary | ICD-10-CM

## 2023-06-08 NOTE — CONFIDENTIAL NOTE
Please see patient's mychart message below regarding lab results from 6/5/23.    Please advise, thanks.

## 2023-06-09 NOTE — TELEPHONE ENCOUNTER
I did place a referral for endocrine and ask that you recheck labs in the meantime. They should call you to schedule. I added a thyroid and lipid to your new blood draw as well.     Your sex hormone binding hormone was slightly low. From my research causing of this could include mild to moderate obesity, diabetes, steroid use and hypothyroidism    Both your hgb a1c and fasting glucose are in prediabetic range

## 2023-06-14 NOTE — TELEPHONE ENCOUNTER
I can add glucose to upcoming lab test- generally we recheck a1c at 3 months for the earliest, but with where your a1c is 6 months would be appropriate.    I would not consider medication until a1c is >7

## 2023-06-14 NOTE — TELEPHONE ENCOUNTER
Routing to provider. Patient is asking when he will be due for his next glucose check please.    Fabian Rogers, Triage RN [place]  9:17 AM 6/14/2023

## 2023-06-15 NOTE — TELEPHONE ENCOUNTER
Auto [employer] message sent to patient.    Fabian Rogers, Triage RN [place]  8:56 AM 6/15/2023

## 2023-07-03 ENCOUNTER — LAB (OUTPATIENT)
Dept: LAB | Facility: CLINIC | Age: 33
End: 2023-07-03
Payer: COMMERCIAL

## 2023-07-03 DIAGNOSIS — R73.09 ELEVATED GLUCOSE: ICD-10-CM

## 2023-07-03 DIAGNOSIS — E22.1 HYPERPROLACTINEMIA (H): ICD-10-CM

## 2023-07-03 DIAGNOSIS — Z00.00 ROUTINE GENERAL MEDICAL EXAMINATION AT A HEALTH CARE FACILITY: ICD-10-CM

## 2023-07-03 DIAGNOSIS — Z13.220 LIPID SCREENING: ICD-10-CM

## 2023-07-03 LAB
CHOLEST SERPL-MCNC: 224 MG/DL
FASTING STATUS PATIENT QL REPORTED: YES
GLUCOSE SERPL-MCNC: 95 MG/DL (ref 70–99)
HDLC SERPL-MCNC: 35 MG/DL
LDLC SERPL CALC-MCNC: 156 MG/DL
NONHDLC SERPL-MCNC: 189 MG/DL
PROLACTIN SERPL 3RD IS-MCNC: 28 NG/ML (ref 4–15)
TRIGL SERPL-MCNC: 167 MG/DL
TSH SERPL DL<=0.005 MIU/L-ACNC: 1.2 UIU/ML (ref 0.3–4.2)

## 2023-07-03 PROCEDURE — 82947 ASSAY GLUCOSE BLOOD QUANT: CPT

## 2023-07-03 PROCEDURE — 84146 ASSAY OF PROLACTIN: CPT

## 2023-07-03 PROCEDURE — 36415 COLL VENOUS BLD VENIPUNCTURE: CPT

## 2023-07-03 PROCEDURE — 80061 LIPID PANEL: CPT

## 2023-07-03 PROCEDURE — 84443 ASSAY THYROID STIM HORMONE: CPT

## 2023-07-13 ENCOUNTER — DOCUMENTATION ONLY (OUTPATIENT)
Dept: OTHER | Facility: CLINIC | Age: 33
End: 2023-07-13
Payer: COMMERCIAL

## 2023-07-19 ASSESSMENT — SLEEP AND FATIGUE QUESTIONNAIRES
HOW LIKELY ARE YOU TO NOD OFF OR FALL ASLEEP WHEN YOU ARE A PASSENGER IN A CAR FOR AN HOUR WITHOUT A BREAK: WOULD NEVER DOZE
HOW LIKELY ARE YOU TO NOD OFF OR FALL ASLEEP WHILE SITTING QUIETLY AFTER LUNCH WITHOUT ALCOHOL: WOULD NEVER DOZE
HOW LIKELY ARE YOU TO NOD OFF OR FALL ASLEEP WHILE SITTING AND READING: SLIGHT CHANCE OF DOZING
HOW LIKELY ARE YOU TO NOD OFF OR FALL ASLEEP WHILE LYING DOWN TO REST IN THE AFTERNOON WHEN CIRCUMSTANCES PERMIT: MODERATE CHANCE OF DOZING
HOW LIKELY ARE YOU TO NOD OFF OR FALL ASLEEP WHILE SITTING INACTIVE IN A PUBLIC PLACE: WOULD NEVER DOZE
HOW LIKELY ARE YOU TO NOD OFF OR FALL ASLEEP WHILE WATCHING TV: SLIGHT CHANCE OF DOZING
HOW LIKELY ARE YOU TO NOD OFF OR FALL ASLEEP IN A CAR, WHILE STOPPED FOR A FEW MINUTES IN TRAFFIC: WOULD NEVER DOZE
HOW LIKELY ARE YOU TO NOD OFF OR FALL ASLEEP WHILE SITTING AND TALKING TO SOMEONE: WOULD NEVER DOZE

## 2023-07-25 ENCOUNTER — VIRTUAL VISIT (OUTPATIENT)
Dept: SLEEP MEDICINE | Facility: CLINIC | Age: 33
End: 2023-07-25
Payer: COMMERCIAL

## 2023-07-25 VITALS — WEIGHT: 181 LBS | HEIGHT: 68 IN | BODY MASS INDEX: 27.43 KG/M2

## 2023-07-25 DIAGNOSIS — G47.9 DISTURBANCE IN SLEEP BEHAVIOR: ICD-10-CM

## 2023-07-25 DIAGNOSIS — R06.83 SNORING: Primary | ICD-10-CM

## 2023-07-25 DIAGNOSIS — R06.89 DYSPNEA AND RESPIRATORY ABNORMALITY: ICD-10-CM

## 2023-07-25 DIAGNOSIS — R06.00 DYSPNEA AND RESPIRATORY ABNORMALITY: ICD-10-CM

## 2023-07-25 PROCEDURE — 99244 OFF/OP CNSLTJ NEW/EST MOD 40: CPT | Mod: VID | Performed by: INTERNAL MEDICINE

## 2023-07-25 ASSESSMENT — PAIN SCALES - GENERAL: PAINLEVEL: NO PAIN (0)

## 2023-07-25 NOTE — PROGRESS NOTES
Virtual Visit Details    Type of service:  Video Visit   Video Start Time: 2:21 PM  Video End Time:2:52 PM    Originating Location (pt. Location): Home    Distant Location (provider location):  Off-site  Platform used for Video Visit: [employer]

## 2023-07-25 NOTE — PROGRESS NOTES
Outpatient Sleep Medicine Consultation:      Name: Andreas Araujo MRN# [id number]   Age: 33 year old YOB: 1990     Date of Consultation: July 25, 2023  Consultation is requested by: Matty Garcia NP  [address] [place] [address] Matty Garcia  Primary care provider: Matty Garcia       Reason for Sleep Consult:     Andreas Araujo is sent by Matty Garcia for a sleep consultation regarding snoring.    Patient s Reason for visit  Andreas Araujo main reason for visit: wife thinks im not breathing in my sleep, also lately not staying asleep  Patient states problem(s) started: the not breathing 3-6 years, the not rested staying asleep about 2 months  Andreas Araujo's goals for this visit: to get some answers and see what the next step could be           Assessment and Plan:     Socially disruptive snoring, witnessed apnea, nocturia, bruxism  - Patient appears to be a good candidate for Home Sleep Test STOPBANG  3 due to delayed sleep phase  -  If HSAT normal would recommend attended Polysomnogram. If mild obstructive sleep apnea would want CPAP. If moderate-severe would recommend CPAP       Sleep maintenance difficulties (KAYLA 14)  Recent episode, resolved       Summary Counseling:    Sleep Testing Reviewed  Obstructive Sleep Apnea Reviewed  Complications of Untreated Sleep Apnea Reviewed  Treatment options for obstructive sleep apnea reviewed     I spent 25 minutes with patient including counseling, and 10 minutes with chart review, and documentation     CC: Matty Garcia          History of Present Illness:       SLEEP-WAKE SCHEDULE:     Work/School Days: Patient goes to school/work: Yes 10-1 and 3-6 on  M, W, Th  Usually gets into bed at 12am-3am  Takes patient about 5-30 minutes to fall asleep  Has no trouble falling asleep   Wakes up in the middle of the night 2-3 usually at 5-6am and 8:00am - get up time on a work day is 9:15am times.  Wakes up due to Use the bathroom;Uncertain  He has  had trouble falling back asleep  He had a bad 2 weeks when he made the appointment  Usually its  Not a problem   It usually takes 10-15 minutes to get back to sleep  Patient is usually up at work day 9:15 - off day 11:00am  Uses alarm: Yes   due to an over-active mind      Weekends/Non-work Days/All Other Days:  Usually gets into bed at 12:00am - 3:00am   Takes patient about 5-10 minutes to fall asleep  Patient is usually up at 11:00am  Uses alarm: No    Sleep Need  Patient gets  7-9 hours sleep on average   Patient thinks he needs about not sure - tired regardless. my guess is 8 sleep    Andreas Araujo prefers to sleep in this position(s): Back   Patient states they do the following activities in bed: Use phone, computer, or tablet    Naps  Patient takes a purposeful nap 0-1 times a week and naps are usually 2 hrs in duration  He feels better after a nap: No  He dozes off unintentionally 0-1 days per week  Patient has had a driving accident or near-miss due to sleepiness/drowsiness: No      SLEEP DISRUPTIONS:    Breathing/Snoring  Patient snores:Yes  Other people complain about his snoring: Yes  Patient has been told he stops breathing in his sleep:Yes  He has issues with the following: Morning mouth dryness    Movement:  Patient gets pain, discomfort, with an urge to move:  No  It happens when he is resting:  No  It happens more at night:  No  Patient has been told he kicks his legs at night:  No     Behaviors in Sleep:  Andreas Araujo has experienced the following behaviors while sleeping: Teeth grinding  He has experienced sudden muscle weakness during the day: No      Is there anything else you would like your sleep provider to know: What prompted the appt was that when I called my sleep was fairly terrible.  I was waking up early and staying up not able to fall back asleep.  My wife noticed i would toss and turn all night.      CAFFEINE AND OTHER SUBSTANCES:    Patient consumes caffeinated beverages per day:   120-150mg of caffeine per day avrg/ 250 peak  Last caffeine use is usually: 6-9pm   List of any prescribed or over the counter stimulants that patient takes:    List of any prescribed or over the counter sleep medication patient takes:    List of previous sleep medications that patient has tried: melatonin  Patient drinks alcohol to help them sleep: No  Patient drinks alcohol near bedtime: No    Family History:  Patient has a family member been diagnosed with a sleep disorder: Yes  my mother was recently diagnosed with sleep issues (unsure of what/ mentioned in passing)         SCALES:    EPWORTH SLEEPINESS SCALE         7/19/2023    10:33 AM    Midland Sleepiness Scale ( JOSE Garg  [id number]<br>ESS - [place]/English - Final version - 21 Nov 07 - [facility] Research [place].)   Sitting and reading Slight chance of dozing   Watching TV Slight chance of dozing   Sitting, inactive in a public place (e.g. a theatre or a meeting) Would never doze   As a passenger in a car for an hour without a break Would never doze   Lying down to rest in the afternoon when circumstances permit Moderate chance of dozing   Sitting and talking to someone Would never doze   Sitting quietly after a lunch without alcohol Would never doze   In a car, while stopped for a few minutes in traffic Would never doze   Midland Score (MC) 4   Midland Score (Sleep) 4         INSOMNIA SEVERITY INDEX (KAYLA)          7/19/2023    10:11 AM   Insomnia Severity Index (KAYLA)   Difficulty falling asleep 1   Difficulty staying asleep 2   Problems waking up too early 2   How SATISFIED/DISSATISFIED are you with your CURRENT sleep pattern? 3   How NOTICEABLE to others do you think your sleep problem is in terms of impairing the quality of your life? 2   How WORRIED/DISTRESSED are you about your current sleep problem? 2   To what extent do you consider your sleep problem to INTERFERE with your daily functioning (e.g. daytime fatigue, mood, ability to function at work/daily  chores, concentration, memory, mood, etc.) CURRENTLY? 2   KAYLA Total Score 14       Guidelines for Scoring/Interpretation:  Total score categories:  0-7 = No clinically significant insomnia   8-14 = Subthreshold insomnia   15-21 = Clinical insomnia (moderate severity)  22-28 = Clinical insomnia (severe)  Used via courtesy of www.myhealth.va.gov with permission from Ryan Zambrano PhD., [facility]        Allergies:    Allergies   Allergen Reactions     Penicillins      rash     Sulfamethoxazole W/Trimethoprim      septra  nausea       Medications:    Current Outpatient Medications   Medication Sig Dispense Refill     Multiple Vitamin (MULTIVITAMIN ADULT PO) Take 1 tablet by mouth daily         Problem List:  There are no problems to display for this patient.       Past Medical/Surgical History:  Past Medical History:   Diagnosis Date     Asthma 2001    As a child     Past Surgical History:   Procedure Laterality Date     ZZC NONSPECIFIC PROCEDURE      oral surgery       Social History:  Social History     Socioeconomic History     Marital status:      Spouse name: Not on file     Number of children: Not on file     Years of education: Not on file     Highest education level: Not on file   Occupational History     Not on file   Tobacco Use     Smoking status: Never     Smokeless tobacco: Never     Tobacco comments:     no second hand smoke exposure   Vaping Use     Vaping Use: Never used   Substance and Sexual Activity     Alcohol use: Yes     Comment: 1-5 times a year     Drug use: No     Sexual activity: Yes     Partners: Female     Birth control/protection: Condom   Other Topics Concern     Parent/sibling w/ CABG, MI or angioplasty before 65F 55M? No   Social History Narrative     Not on file     Social Determinants of Health     Financial Resource Strain: Not on file   Food Insecurity: Not on file   Transportation Needs: Not on file   Physical Activity: Not on file   Stress: Not on file   Social  "Connections: Not on file   Intimate Partner Violence: Not on file   Housing Stability: Not on file       Family History:  Family History   Problem Relation Age of Onset     Diabetes Mother         Recently diagnosed 2023 - before that no issues     Family History Negative No family hx of        Review of Systems:  A complete review of systems reviewed by me is negative with the exeption of what has been mentioned in the history of present illness.  In the last TWO WEEKS have you experienced any of the following symptoms?  Fevers: No  Night Sweats: No  Weight Gain: No  Pain at Night: No  Double Vision: No  Changes in Vision: No  Difficulty Breathing through Nose: No  Sore Throat in Morning: No  Dry Mouth in the Morning: Yes  Shortness of Breath Lying Flat: No  Shortness of Breath With Activity: No  Awakening with Shortness of Breath: No  Increased Cough: No  Heart Racing at Night: No  Swelling in Feet or Legs: No  Diarrhea at Night: No  Heartburn at Night: No  Urinating More than Once at Night: Yes  Losing Control of Urine at Night: No  Joint Pains at Night: No  Headaches in Morning: No  Weakness in Arms or Legs: No  Depressed Mood: No  Anxiety: No     Physical Examination:  Vitals: Ht 1.727 m (5' 8\")   Wt 82.1 kg (181 lb)   BMI 27.52 kg/m    BMI= Body mass index is 27.52 kg/m .    SpO2 Readings from Last 4 Encounters:   06/05/23 100%   09/19/19 94%   06/04/18 96%   08/13/16 93%       GENERAL APPEARANCE: alert and no distress  EYES: Eyes grossly normal to inspection  NECK: not overly generous size  LUNGS: no shortness of breath , cough  NEURO: mentation intact, speech normal and cranial nerves 2-12 appear intact  PSYCH: affect normal/bright             Data: All pertinent previous laboratory data reviewed     Recent Labs   Lab Test 07/03/23  1030 06/05/23  0840 09/19/19  1401   NA  --  138 138   POTASSIUM  --  4.4 4.1   CHLORIDE  --  103 106   CO2  --  24 25   ANIONGAP  --  11 7   GLC 95 112* 84   BUN  --  14.2 16 "   CR  --  0.99 0.96   LILI  --  9.8 9.1       Recent Labs   Lab Test 06/05/23  0840   WBC 8.5   RBC 5.15   HGB 15.4   HCT 46.3   MCV 90   MCH 29.9   MCHC 33.3   RDW 11.8          Recent Labs   Lab Test 06/05/23  0840   PROTTOTAL 8.1   ALBUMIN 4.8   BILITOTAL 0.4   ALKPHOS 92   AST 40   ALT 43       TSH (uIU/mL)   Date Value   07/03/2023 1.20         Cl Talamantes MD 7/25/2023

## 2023-07-25 NOTE — NURSING NOTE
Is the patient currently in the state of [place]? YES    Visit mode:VIDEO    If the visit is dropped, the patient can be reconnected by: VIDEO VISIT: Text to cell phone: [phone]    Will anyone else be joining the visit? NO      How would you like to obtain your AVS? MyChart    Are changes needed to the allergy or medication list? NO    Reason for visit: Consult      Iam Jolley

## 2023-07-27 ENCOUNTER — TELEPHONE (OUTPATIENT)
Dept: SLEEP MEDICINE | Facility: CLINIC | Age: 33
End: 2023-07-27
Payer: COMMERCIAL

## 2023-07-27 NOTE — TELEPHONE ENCOUNTER
General Call    Contacts       Type Contact Phone/Fax    07/27/2023 01:52 PM CDT Phone (Incoming) Andreas Araujo (Self) [phone] (H)        Reason for Call:  Sleep study    What are your questions or concerns:  Andreas would like to know if he lost an hour or two of sleep due to returning equipment would that throw off the test?    Date of last appointment with provider: 7/25/2023    Could we send this information to you in [facility] or would you prefer to receive a phone call?:   Patient would prefer a phone call   Okay to leave a detailed message?: No at Home number on file [phone] ([place])

## 2023-07-28 NOTE — TELEPHONE ENCOUNTER
Detailed message left on number provided. Let patient know we would reach out if not enough data was collected.     Lore MC RN  [facility]

## 2023-08-02 ENCOUNTER — TELEPHONE (OUTPATIENT)
Dept: SLEEP MEDICINE | Facility: CLINIC | Age: 33
End: 2023-08-02
Payer: COMMERCIAL

## 2023-08-02 NOTE — TELEPHONE ENCOUNTER
Pt was called to schedule PSG due to HST not being covered by pt's insurance. LVM for them to call back

## 2023-08-02 NOTE — TELEPHONE ENCOUNTER
Received following email please review    Hello!    The below patient is scheduled for an upcoming HST- this is not a covered benefit per their plan- they can have a PSG with auth- [employer]/ [employer] is out 15 days for PM[employer] members         Thank you!     Mila Taylor  Financial   [facility]  [facility]  [address] [place] [address]  [email or web address]  [email or web address]  Office: [phone]  Fax: [phone] Connect with [place] [employer] on social media.

## 2023-08-09 NOTE — NURSING NOTE
Writer left detail message to call clinic back to reschedule the follow up with provider 2 weeks AFTER sleep study. NOT before sleep study.

## 2023-08-09 NOTE — TELEPHONE ENCOUNTER
"Writer left a detail message for patient on voicemail. Writer stated his sleep study appointment is scheduled after the follow up appointment with Dr. Talamantes.     Upon call back please try to get patient in sooner than January for PSG diagnostic with \"late sleeper\" & reschedule follow up.   "

## 2023-08-16 NOTE — TELEPHONE ENCOUNTER
Patient has already been scheduled for sleep study and follow up. Writer sent out [facility] message regarding sleep study information

## 2023-10-16 ENCOUNTER — VIRTUAL VISIT (OUTPATIENT)
Dept: ENDOCRINOLOGY | Facility: CLINIC | Age: 33
End: 2023-10-16
Payer: COMMERCIAL

## 2023-10-16 ENCOUNTER — LAB (OUTPATIENT)
Dept: LAB | Facility: CLINIC | Age: 33
End: 2023-10-16
Payer: COMMERCIAL

## 2023-10-16 ENCOUNTER — TELEPHONE (OUTPATIENT)
Dept: ENDOCRINOLOGY | Facility: CLINIC | Age: 33
End: 2023-10-16

## 2023-10-16 VITALS — WEIGHT: 173 LBS | HEIGHT: 68 IN | BODY MASS INDEX: 26.22 KG/M2

## 2023-10-16 DIAGNOSIS — E22.1 HYPERPROLACTINEMIA (H): ICD-10-CM

## 2023-10-16 PROCEDURE — 36415 COLL VENOUS BLD VENIPUNCTURE: CPT

## 2023-10-16 PROCEDURE — 99204 OFFICE O/P NEW MOD 45 MIN: CPT | Mod: VID | Performed by: INTERNAL MEDICINE

## 2023-10-16 PROCEDURE — 84146 ASSAY OF PROLACTIN: CPT

## 2023-10-16 ASSESSMENT — PAIN SCALES - GENERAL: PAINLEVEL: NO PAIN (0)

## 2023-10-16 NOTE — NURSING NOTE
Is the patient currently in the state of [place]? YES    Visit mode:VIDEO    If the visit is dropped, the patient can be reconnected by: VIDEO VISIT: Text to cell phone:   Telephone Information:   Mobile [phone]    and VIDEO VISIT: Send to e-mail at: [email or web address]    Will anyone else be joining the visit? NO  (If patient encounters technical issues they should call [phone]    How would you like to obtain your AVS? MyChart    Are changes needed to the allergy or medication list? No    Reason for visit: FRANCISCO ANTONIO

## 2023-10-16 NOTE — PROGRESS NOTES
"THIS IS A VIDEO VISIT:    Phone call visit/virtual visit encounter:    Name of patient: Andreas Araujo    Date of encounter: 10/16/2023    Time of start of video visit: 11:30    Video started: 11:40    Video ended: 11:56    Provider location: working from home/ [facility]    Patient location: patients home.    Mode of transmission: [employer] video/ [employer]    Verbal consent: obtained before starting visit. Pt is agreeable.      The patient has been notified of following:      \"This VIDEO visit will be conducted via a call between you and your physician/provider. We have found that certain health care needs can be provided without the need for a physical exam.  This service lets us provide the care you need with a short phone conversation.  If a prescription is necessary we can send it directly to your pharmacy.  If lab work is needed we can place an order for that and you can then stop by our lab to have the test done at a later time.     With new updates with corona virus patient might be billed as clinic visit.     If during the course of the call the physician/provider feels a telephone visit is not appropriate, you will not be charged for this service.\"      Past medical history, social history, family history, allergy and medications were reviewed and updated as appropriate.  Reviewed pertinent labs, notes, imaging studies personally.    Name: Andreas Araujo  Seen in consultation with Matty Garcia for elevated prolactin.  HPI:  Andreas Araujo is a 33 year old male who presents for the evaluation of hyperprolactinemia .   has a past medical history of Asthma (2001).    H/o hyperprolactinemia at the age of 21. MRI was neg at that time.  (At that time, he was losing wt and had ED. He was on cabergoline X 2 years and then stopped it)    When seen by PCP in 6/2023- he reported similar symptoms of fatigue, erectile dysfunction and prolactin was checked.  Initial prolactin was 18 and repeat check was 28.  Following " that he was referred to endocrinology  Normal TSH, testosterone levels.    Breast discharge: No  No breast enlargement or pain.  Changes in peripheral vision: No  Headaches: No  Medications: No  Fertility: his wife is pregnant.  ED: has erectile dysfunction X on and off.  Libido: no change  Energy: more fatigued.  Weight: + intentional wt loss- 16 lbs in last 4 months.    PMH/PSH:  Past Medical History:   Diagnosis Date    Asthma 2001    As a child     Past Surgical History:   Procedure Laterality Date    CIRCUMCISION  2007    high school    DENTAL SURGERY  2007    wisdom teeth, high school     Family Hx:  Family History   Problem Relation Age of Onset    Diabetes Mother         Recently diagnosed 2023 - before that no issues    Family History Negative No family hx of        Social Hx:  Social History     Socioeconomic History    Marital status:      Spouse name: Not on file    Number of children: Not on file    Years of education: Not on file    Highest education level: Not on file   Occupational History    Occupation: Chiropracter     Employer: SELF EMPLOYED.   Tobacco Use    Smoking status: Never    Smokeless tobacco: Never    Tobacco comments:     no second hand smoke exposure   Vaping Use    Vaping Use: Never used   Substance and Sexual Activity    Alcohol use: Yes     Comment: 1-5 times a year    Drug use: No    Sexual activity: Yes     Partners: Female     Birth control/protection: Condom   Other Topics Concern    Parent/sibling w/ CABG, MI or angioplasty before 65F 55M? No   Social History Narrative    Not on file     Social Determinants of Health     Financial Resource Strain: Not on file   Food Insecurity: Not on file   Transportation Needs: Not on file   Physical Activity: Not on file   Stress: Not on file   Social Connections: Not on file   Interpersonal Safety: Not on file   Housing Stability: Not on file          MEDICATIONS:  has a current medication list which includes the following  "prescription(s): multiple vitamin.    ROS     ROS: 10 point ROS neg other than the symptoms noted above in the HPI.    Physical Exam   VS: Ht 1.727 m (5' 8\")   Wt 78.5 kg (173 lb)   BMI 26.30 kg/m    GENERAL: healthy, alert and no distress  EYES: Eyes grossly normal to inspection, conjunctivae and sclerae normal  ENT: no nose swelling, nasal discharge.  Thyroid: no apparent thyroid nodules  RESP: no audible wheeze, cough, or visible cyanosis.  No visible retractions or increased work of breathing.  Able to speak fully in complete sentences.  ABDO: not evaluated.  EXTREMITIES: no hand tremors.  NEURO: Cranial nerves grossly intact, mentation intact and speech normal  SKIN: No apparent skin lesions, rash or edema seen   PSYCH: mentation appears normal, affect normal/bright, judgement and insight intact, normal speech and appearance well-groomed      LABS:    All pertinent notes, labs, and images personally reviewed by me.     PROLACTIN:  Component      Latest Ref Rng 7/3/2023  10:30 AM   Prolactin      4 - 15 ng/mL 28 (H)       TFTs:  TSH   Date Value Ref Range Status   07/03/2023 1.20 0.30 - 4.20 uIU/mL Final     Testosterone:  Component      Latest Ref Rng 6/5/2023  8:40 AM   Free Testosterone Calculated      ng/dL 11.56    Testosterone Total      240 - 950 ng/dL 345    Sex Hormone Binding Globulin      11 - 80 nmol/L 9 (L)       A/P  Mr.Tony Araujo is a 33 year old here for the evaluation of elevated prolactin:    Hyperprolactinemia (X2):  Comment: High prolactin X2.  Previously had similar problem in his 20s and was on cabergoline for few years.  No breast symptoms.  He is not on any medication causing high prolactin levels.  Normal TSH and testosterone levels.  Plan:   Discussed diagnosis, pathophysiology, management and treatment options of condition with pt.  Recommend repeat prolactin check.  Brain MRI  Further work-up/consider medication based on that.    Plan: Prolactin, MR Brain w/o & w Contrast        "     Prolactin secreting pituitary tumors are the most common type of hormone secreting pituitary tumors.     Causes: Hypothyroidism can be associated with hyperprolactinemia. Hypothyroidism results in increased TRH production, then TRH (which can act as a PRF) could lead to hyperprolactinemia. Estrogens act directly at the pituitary level, causing stimulation of lactotrophs, and thus enhance prolactin secretion. Injury to the chest wall can also lead to hyperprolactinemia; this results from abnormal stimulation of the reflex associated with the rise in prolactin seen in suckling. Certain drugs act as dopamine-receptor-blocking agents, including phenothiazines (e.g. chlorpromazine), butyrophenones (haloperidol), and benzamides (metoclopramide, sulpiride, and domperidone). These drugs block the effects of endogenous dopamine and thus release lactotrophs from their hypothalamic inhibition. Pituitary or stalk tumors with abnormal blood supplies, or their pressure effects, may interfere with the circulatory pathway from the hypothalamus down the pituitary stalk to the normal lactotrophs or a tumor, producing effective dopamine deficiency due to a functional stalk section. Diseases of the hypothalamus, such as tumors, arterio-venous malformations, and inflammatory processes such as sarcoidosis result in either diminished synthesis or release of dopamine. Certain drugs (e.g. alpha-methyldopa and reserpine) are capable of depleting the central dopamine stores.     The symptoms associated with hyperprolactinemia may be due to several factors: the direct effects of excess prolactin à galactorrhea or hypogonadism; the effects of the structural lesion causing the disorder (i.e. the pituitary tumor) à  headaches, visual field defects, or external ophthalmoplegia; or associated dysfunction of secretion of other anterior pituitary hormones.     Women with hyperprolactinemia usually present with menstrual abnormalities - amenorrhea  or oligomenorrhea - or regular cycles with infertility or menorrhagia. Men often present late in the course of the disease with symptoms of expansion of their pituitary tumor (i.e. headaches, visual defects, and external ophthalmoplegia) or symptoms from secondary adrenal or thyroid failure.     A microadenoma has a maximum diameter of up to 10mm, and a macroadenoma as having a diameter > 10mm. A microadenoma has serum prolactin levels below 200ng/mL.  A macroadenoma that secretes prolactin is usually associated with a serum prolactin level of more than 200ng/mL.    When serum prolactin is evaluated by two-site immunometric assays, large amounts of prolactin saturate both the capture and the signal antibodies, impairing their binding, causing serum prolactin to be underestimated (hook effect).   In order to avoid unnecessary surgery (treatment of choice for nonfunctioning tumors), prolactin assays with serum dilution are recommended in patients with macroadenomas who may harbor a prolactinoma.    The anatomy of the pituitary is optimally assessed by MRI. MRI allows imaging of the optic chiasm, the cavernous sinuses, the pituitary (both the normal gland and tumors), and its stalk. MRI allows accurate measurement of the size of the pituitary and of any tumor and its relationship to the optic chiasm and cavernous sinuses.     Cabergoline has an extremely long biological half life and thus only needs to be administered either once or twice per week at a dose of 0.5 mg/dose.  Cabergoline is generally better tolerated than bromocriptine, so increasing the patient's adherence. Therefore, cabergoline is currently considered the first choice drug for the treatment of prolactinomas, except for patients wishing pregnancy in the short-term. Bromocriptine reduces pituitary tumor size in 75-80% of patients with large prolactin-secreting tumors, even with gross extrasellar extension.    Labs to be considered-- prolactin, TSH,  freeT4, Pituitary MRI, urine pregnancy.     More than 50% of the time spent with Mr. Araujo on counseling / coordinating his care.  Discussed indications, risks and benefits of all medications prescribed, and answered questions to patient's satisfaction.     All questions were answered.  The patient indicates understanding of the above issues and agrees with the plan set forth.      Follow-up:  As noted in AVS    Anny Sneed MD  Endocrinology   [facility]  CC: Matty Garcia

## 2023-10-16 NOTE — LETTER
"    10/16/2023         RE: Andreas Araujo  [address]  [place] [address]        Dear Colleague,    Thank you for referring your patient, Andreas Araujo, to the [facility]. Please see a copy of my visit note below.    THIS IS A VIDEO VISIT:    Phone call visit/virtual visit encounter:    Name of patient: Andreas Araujo    Date of encounter: 10/16/2023    Time of start of video visit: 11:30    Video started: 11:40    Video ended: 11:56    Provider location: working from home/ [facility]    Patient location: patients home.    Mode of transmission: [employer] video/ [employer]    Verbal consent: obtained before starting visit. Pt is agreeable.      The patient has been notified of following:      \"This VIDEO visit will be conducted via a call between you and your physician/provider. We have found that certain health care needs can be provided without the need for a physical exam.  This service lets us provide the care you need with a short phone conversation.  If a prescription is necessary we can send it directly to your pharmacy.  If lab work is needed we can place an order for that and you can then stop by our lab to have the test done at a later time.     With new updates with corona virus patient might be billed as clinic visit.     If during the course of the call the physician/provider feels a telephone visit is not appropriate, you will not be charged for this service.\"      Past medical history, social history, family history, allergy and medications were reviewed and updated as appropriate.  Reviewed pertinent labs, notes, imaging studies personally.    Name: Andreas Araujo  Seen in consultation with Matty Garcia for elevated prolactin.  HPI:  Andreas Araujo is a 33 year old male who presents for the evaluation of hyperprolactinemia .   has a past medical history of Asthma (2001).    H/o hyperprolactinemia at the age of 21. MRI was neg at that time.  (At that time, he was losing " wt and had ED. He was on cabergoline X 2 years and then stopped it)    When seen by PCP in 6/2023- he reported similar symptoms of fatigue, erectile dysfunction and prolactin was checked.  Initial prolactin was 18 and repeat check was 28.  Following that he was referred to endocrinology  Normal TSH, testosterone levels.    Breast discharge: No  No breast enlargement or pain.  Changes in peripheral vision: No  Headaches: No  Medications: No  Fertility: his wife is pregnant.  ED: has erectile dysfunction X on and off.  Libido: no change  Energy: more fatigued.  Weight: + intentional wt loss- 16 lbs in last 4 months.    PMH/PSH:  Past Medical History:   Diagnosis Date     Asthma 2001    As a child     Past Surgical History:   Procedure Laterality Date     CIRCUMCISION  2007    high school     DENTAL SURGERY  2007    wisdom teeth, high school     Family Hx:  Family History   Problem Relation Age of Onset     Diabetes Mother         Recently diagnosed 2023 - before that no issues     Family History Negative No family hx of        Social Hx:  Social History     Socioeconomic History     Marital status:      Spouse name: Not on file     Number of children: Not on file     Years of education: Not on file     Highest education level: Not on file   Occupational History     Occupation: Chiropracter     Employer: SELF EMPLOYED.   Tobacco Use     Smoking status: Never     Smokeless tobacco: Never     Tobacco comments:     no second hand smoke exposure   Vaping Use     Vaping Use: Never used   Substance and Sexual Activity     Alcohol use: Yes     Comment: 1-5 times a year     Drug use: No     Sexual activity: Yes     Partners: Female     Birth control/protection: Condom   Other Topics Concern     Parent/sibling w/ CABG, MI or angioplasty before 65F 55M? No   Social History Narrative     Not on file     Social Determinants of Health     Financial Resource Strain: Not on file   Food Insecurity: Not on file   Transportation  "Needs: Not on file   Physical Activity: Not on file   Stress: Not on file   Social Connections: Not on file   Interpersonal Safety: Not on file   Housing Stability: Not on file          MEDICATIONS:  has a current medication list which includes the following prescription(s): multiple vitamin.    ROS     ROS: 10 point ROS neg other than the symptoms noted above in the HPI.    Physical Exam   VS: Ht 1.727 m (5' 8\")   Wt 78.5 kg (173 lb)   BMI 26.30 kg/m    GENERAL: healthy, alert and no distress  EYES: Eyes grossly normal to inspection, conjunctivae and sclerae normal  ENT: no nose swelling, nasal discharge.  Thyroid: no apparent thyroid nodules  RESP: no audible wheeze, cough, or visible cyanosis.  No visible retractions or increased work of breathing.  Able to speak fully in complete sentences.  ABDO: not evaluated.  EXTREMITIES: no hand tremors.  NEURO: Cranial nerves grossly intact, mentation intact and speech normal  SKIN: No apparent skin lesions, rash or edema seen   PSYCH: mentation appears normal, affect normal/bright, judgement and insight intact, normal speech and appearance well-groomed      LABS:    All pertinent notes, labs, and images personally reviewed by me.     PROLACTIN:  Component      Latest Ref Rng 7/3/2023  10:30 AM   Prolactin      4 - 15 ng/mL 28 (H)       TFTs:  TSH   Date Value Ref Range Status   07/03/2023 1.20 0.30 - 4.20 uIU/mL Final     Testosterone:  Component      Latest Ref Rng 6/5/2023  8:40 AM   Free Testosterone Calculated      ng/dL 11.56    Testosterone Total      240 - 950 ng/dL 345    Sex Hormone Binding Globulin      11 - 80 nmol/L 9 (L)       A/P  Mr.Tony Araujo is a 33 year old here for the evaluation of elevated prolactin:    Hyperprolactinemia (X2):  Comment: High prolactin X2.  Previously had similar problem in his 20s and was on cabergoline for few years.  No breast symptoms.  He is not on any medication causing high prolactin levels.  Normal TSH and testosterone " levels.  Plan:   Discussed diagnosis, pathophysiology, management and treatment options of condition with pt.  Recommend repeat prolactin check.  Brain MRI  Further work-up/consider medication based on that.    Plan: Prolactin, MR Brain w/o & w Contrast            Prolactin secreting pituitary tumors are the most common type of hormone secreting pituitary tumors.     Causes: Hypothyroidism can be associated with hyperprolactinemia. Hypothyroidism results in increased TRH production, then TRH (which can act as a PRF) could lead to hyperprolactinemia. Estrogens act directly at the pituitary level, causing stimulation of lactotrophs, and thus enhance prolactin secretion. Injury to the chest wall can also lead to hyperprolactinemia; this results from abnormal stimulation of the reflex associated with the rise in prolactin seen in suckling. Certain drugs act as dopamine-receptor-blocking agents, including phenothiazines (e.g. chlorpromazine), butyrophenones (haloperidol), and benzamides (metoclopramide, sulpiride, and domperidone). These drugs block the effects of endogenous dopamine and thus release lactotrophs from their hypothalamic inhibition. Pituitary or stalk tumors with abnormal blood supplies, or their pressure effects, may interfere with the circulatory pathway from the hypothalamus down the pituitary stalk to the normal lactotrophs or a tumor, producing effective dopamine deficiency due to a functional stalk section. Diseases of the hypothalamus, such as tumors, arterio-venous malformations, and inflammatory processes such as sarcoidosis result in either diminished synthesis or release of dopamine. Certain drugs (e.g. alpha-methyldopa and reserpine) are capable of depleting the central dopamine stores.     The symptoms associated with hyperprolactinemia may be due to several factors: the direct effects of excess prolactin à galactorrhea or hypogonadism; the effects of the structural lesion causing the disorder  (i.e. the pituitary tumor) à  headaches, visual field defects, or external ophthalmoplegia; or associated dysfunction of secretion of other anterior pituitary hormones.     Women with hyperprolactinemia usually present with menstrual abnormalities - amenorrhea or oligomenorrhea - or regular cycles with infertility or menorrhagia. Men often present late in the course of the disease with symptoms of expansion of their pituitary tumor (i.e. headaches, visual defects, and external ophthalmoplegia) or symptoms from secondary adrenal or thyroid failure.     A microadenoma has a maximum diameter of up to 10mm, and a macroadenoma as having a diameter > 10mm. A microadenoma has serum prolactin levels below 200ng/mL.  A macroadenoma that secretes prolactin is usually associated with a serum prolactin level of more than 200ng/mL.    When serum prolactin is evaluated by two-site immunometric assays, large amounts of prolactin saturate both the capture and the signal antibodies, impairing their binding, causing serum prolactin to be underestimated (hook effect).   In order to avoid unnecessary surgery (treatment of choice for nonfunctioning tumors), prolactin assays with serum dilution are recommended in patients with macroadenomas who may harbor a prolactinoma.    The anatomy of the pituitary is optimally assessed by MRI. MRI allows imaging of the optic chiasm, the cavernous sinuses, the pituitary (both the normal gland and tumors), and its stalk. MRI allows accurate measurement of the size of the pituitary and of any tumor and its relationship to the optic chiasm and cavernous sinuses.     Cabergoline has an extremely long biological half life and thus only needs to be administered either once or twice per week at a dose of 0.5 mg/dose.  Cabergoline is generally better tolerated than bromocriptine, so increasing the patient's adherence. Therefore, cabergoline is currently considered the first choice drug for the treatment of  prolactinomas, except for patients wishing pregnancy in the short-term. Bromocriptine reduces pituitary tumor size in 75-80% of patients with large prolactin-secreting tumors, even with gross extrasellar extension.    Labs to be considered-- prolactin, TSH, freeT4, Pituitary MRI, urine pregnancy.     More than 50% of the time spent with Mr. Araujo on counseling / coordinating his care.  Discussed indications, risks and benefits of all medications prescribed, and answered questions to patient's satisfaction.     All questions were answered.  The patient indicates understanding of the above issues and agrees with the plan set forth.      Follow-up:  As noted in AVS    Anny Sneed MD  Endocrinology   [facility]/[place]  CC: Matty Garcia              Again, thank you for allowing me to participate in the care of your patient.        Sincerely,        Anny Sneed MD

## 2023-10-16 NOTE — TELEPHONE ENCOUNTER
[facility] Call Center    Phone Message    May a detailed message be left on voicemail: yes     Reason for Call: Other: .     Patient is wanting to know if he is needing to be fasting for the lab work that was placed by Dr. Sneed. Patient states he has an appt for lab today at 7:00pm and needing to know ASAP if that requires fasting. Patient would like to get a call back at confidential line and able to leave a detailed message if not answered. Please advise         Action Taken: Message routed to:  [facility] ([facility]): Endo    Travel Screening: Not Applicable

## 2023-10-16 NOTE — PATIENT INSTRUCTIONS
[facility]  Dr Sneed, Endocrinology Department    [place] [address] [id number]  [place] [address]  Appointment [phone]  Fax: [phone]  [place]: Monday - Thursday      Labs needed.  MRI with Radiology.  Follow up after that.     [facility] radiology scheduleing   [place]  [phone]   [facility] Radiology scheduling  [place]  [phone]     Please call and schedule the recommended test as discussed in clinic visit. These are the numbers to call.

## 2023-10-17 LAB — PROLACTIN SERPL 3RD IS-MCNC: 13 NG/ML (ref 4–15)

## 2023-12-28 ASSESSMENT — SLEEP AND FATIGUE QUESTIONNAIRES
HOW LIKELY ARE YOU TO NOD OFF OR FALL ASLEEP WHILE WATCHING TV: SLIGHT CHANCE OF DOZING
HOW LIKELY ARE YOU TO NOD OFF OR FALL ASLEEP WHILE SITTING AND TALKING TO SOMEONE: WOULD NEVER DOZE
HOW LIKELY ARE YOU TO NOD OFF OR FALL ASLEEP WHILE SITTING AND READING: SLIGHT CHANCE OF DOZING
HOW LIKELY ARE YOU TO NOD OFF OR FALL ASLEEP IN A CAR, WHILE STOPPED FOR A FEW MINUTES IN TRAFFIC: WOULD NEVER DOZE
HOW LIKELY ARE YOU TO NOD OFF OR FALL ASLEEP WHILE SITTING INACTIVE IN A PUBLIC PLACE: WOULD NEVER DOZE
HOW LIKELY ARE YOU TO NOD OFF OR FALL ASLEEP WHILE SITTING QUIETLY AFTER LUNCH WITHOUT ALCOHOL: WOULD NEVER DOZE
HOW LIKELY ARE YOU TO NOD OFF OR FALL ASLEEP WHILE LYING DOWN TO REST IN THE AFTERNOON WHEN CIRCUMSTANCES PERMIT: WOULD NEVER DOZE
HOW LIKELY ARE YOU TO NOD OFF OR FALL ASLEEP WHEN YOU ARE A PASSENGER IN A CAR FOR AN HOUR WITHOUT A BREAK: SLIGHT CHANCE OF DOZING

## 2024-01-04 ENCOUNTER — THERAPY VISIT (OUTPATIENT)
Dept: SLEEP MEDICINE | Facility: CLINIC | Age: 34
End: 2024-01-04
Attending: INTERNAL MEDICINE
Payer: COMMERCIAL

## 2024-01-04 DIAGNOSIS — R06.83 SNORING: ICD-10-CM

## 2024-01-04 DIAGNOSIS — G47.9 DISTURBANCE IN SLEEP BEHAVIOR: ICD-10-CM

## 2024-01-04 DIAGNOSIS — R06.89 DYSPNEA AND RESPIRATORY ABNORMALITY: ICD-10-CM

## 2024-01-04 DIAGNOSIS — R06.00 DYSPNEA AND RESPIRATORY ABNORMALITY: ICD-10-CM

## 2024-01-04 LAB — SLPCOMP: NORMAL

## 2024-01-04 PROCEDURE — 95810 POLYSOM 6/> YRS 4/> PARAM: CPT | Performed by: INTERNAL MEDICINE

## 2024-01-17 PROBLEM — G47.33 OSA (OBSTRUCTIVE SLEEP APNEA): Chronic | Status: ACTIVE | Noted: 2024-01-17

## 2024-01-17 NOTE — PROCEDURES
"          SLEEP STUDY INTERPRETATION  DIAGNOSTIC POLYSOMNOGRAPHY REPORT      Patient: KEKE VELASQUEZ  YOB: 1990  Study Date: 1/4/2024  MRN: [id number]  Referring Provider: -  Ordering Provider: MD Cl Talamantes    Indications for Polysomnography: The patient is a 33 year old Male who is 5' 8\" and weighs 181.0 lbs. His BMI is 27.8, [place] sleepiness scale 4.   A diagnostic polysomnogram was performed to evaluate for socially disruptive snoring, witnessed apnea, nocturia, bruxism    Polysomnogram Data: A full night polysomnogram recorded the standard physiologic parameters including EEG, EOG, EMG, ECG, nasal and oral airflow. Respiratory parameters of chest and abdominal movements were recorded with respiratory inductance plethysmography. Oxygen saturation was recorded by pulse oximetry. Hypopnea scoring rule used: 1B 4%.    Sleep Architecture:    The total recording time of the polysomnogram was 468.1 minutes. The total sleep time was 285.0 minutes. Sleep latency was 65.1 minutes while using phone for 50 minutes and without the use of a sleep aid. REM latency was 199.0 minutes. Arousal index was increased at 37.9 arousals per hour. Sleep efficiency was decreased at 60.9%. Wake after sleep onset was 118.0 minutes. The patient spent 9.5% of total sleep time in Stage N1, 39.6% in Stage N2, 41.1% in Stage N3, and 9.8% in REM. Time in REM supine was 28.0 minutes.    Respiration:    Events ? The polysomnogram revealed a presence of 6 obstructive, 0 central, and 0 mixed apneas resulting in an apnea index of 1.3 events per hour. There were 34 obstructive hypopneas and 0 central hypopneas resulting in an obstructive hypopnea index of 7.2 and central hypopnea index of 0 events per hour. The combined apnea/hypopnea index was 8.4 events per hour (central apnea/hypopnea index was 0 events per hour). The REM AHI was 30.0 events per hour. The supine AHI was 8.4 events per hour. Non-supine AHI n/a.  The RDI was 16.2 events " per hour.  Snoring - was reported as moderate to loud and intermittent.  Respiratory rate and pattern - was notable for normal respiratory rate and pattern.  Sustained Sleep Associated Hypoventilation - Transcutaneous carbon dioxide monitoring was not used, however significant hypoventilation was not suggested by oximetry   Sleep Associated Hypoxemia - (Greater than 5 minutes O2 sat at or below 88%) was not present. Baseline oxygen saturation was 94.6%. Lowest oxygen saturation was 88.0%. Time spent less than or equal to 88% was 0.1 minutes. Time spent less than or equal to 89% was 0.2 minutes.    Movement Activity:    Periodic Limb Activity - There were 0 PLMs during the entire study.   REM EMG Activity - Excessive transient/sustained muscle activity was not present.  Nocturnal Behavior - Abnormal sleep related behaviors were not noted   Bruxism - None apparent.    Cardiac Summary:   The average pulse rate was 69.2 bpm. The minimum pulse rate was 53.0 bpm while the maximum pulse rate was 103.0 bpm.  Arrhythmias were not noted.      Assessment:   Mild obstructive sleep apnea in the supine position  No non-supine sleep seen     Recommendations:  Based on the presence of mild obstructive sleep apnea and symptoms or comorbidities, treatment could be empirically initiated with Auto?titrating PAP therapy with a range of 5 to 15 cmH2O. Recommend clinical follow up with sleep management team.  Patient may be a candidate for dental appliance through referral to Sleep Dentistry for the treatment of obstructive sleep apnea and/or socially disruptive snoring.  If devices are not acceptable or effective, patient may benefit from evaluation of possible surgical options. If he is interested, would recommend referral to specialized ENT-Sleep provider.  Advice regarding the risks of drowsy driving.  Suggest optimizing sleep schedule and avoiding sleep deprivation.  Weight management (if BMI > 30).    Diagnostic Codes:   Obstructive  Sleep Apnea G47.33       _____________________________________   Electronically Signed By: Cl Talamantes MD 1/17/24           Range(%) Time in range (min)   0.0 - 89.0 0.2   0.0 - 88.0 0.1         Stage Min(mm Hg) Max(mm Hg)   Wake - -   NREM(1+2+3) - -   REM - -       Range(mmHg) Time in range (min)   55.0 - 100.0 -   Excluded data <20.0 & >65.0 469.0

## 2024-01-24 ENCOUNTER — MYC MEDICAL ADVICE (OUTPATIENT)
Dept: SLEEP MEDICINE | Facility: CLINIC | Age: 34
End: 2024-01-24
Payer: COMMERCIAL

## 2024-08-04 ENCOUNTER — HEALTH MAINTENANCE LETTER (OUTPATIENT)
Age: 34
End: 2024-08-04

## 2025-04-30 SDOH — HEALTH STABILITY: PHYSICAL HEALTH: ON AVERAGE, HOW MANY DAYS PER WEEK DO YOU ENGAGE IN MODERATE TO STRENUOUS EXERCISE (LIKE A BRISK WALK)?: 5 DAYS

## 2025-04-30 SDOH — HEALTH STABILITY: PHYSICAL HEALTH: ON AVERAGE, HOW MANY MINUTES DO YOU ENGAGE IN EXERCISE AT THIS LEVEL?: 30 MIN

## 2025-04-30 ASSESSMENT — SOCIAL DETERMINANTS OF HEALTH (SDOH): HOW OFTEN DO YOU GET TOGETHER WITH FRIENDS OR RELATIVES?: ONCE A WEEK

## 2025-05-01 ASSESSMENT — SLEEP AND FATIGUE QUESTIONNAIRES

## 2025-05-05 ENCOUNTER — OFFICE VISIT (OUTPATIENT)
Dept: PEDIATRICS | Facility: CLINIC | Age: 35
End: 2025-05-05
Payer: COMMERCIAL

## 2025-05-05 ENCOUNTER — MYC MEDICAL ADVICE (OUTPATIENT)
Dept: PEDIATRICS | Facility: CLINIC | Age: 35
End: 2025-05-05

## 2025-05-05 VITALS
OXYGEN SATURATION: 96 % | HEART RATE: 76 BPM | SYSTOLIC BLOOD PRESSURE: 121 MMHG | HEIGHT: 68 IN | TEMPERATURE: 97.6 F | DIASTOLIC BLOOD PRESSURE: 83 MMHG | RESPIRATION RATE: 16 BRPM | WEIGHT: 174.5 LBS | BODY MASS INDEX: 26.45 KG/M2

## 2025-05-05 DIAGNOSIS — Z00.00 LABORATORY EXAMINATION ORDERED AS PART OF A ROUTINE GENERAL MEDICAL EXAMINATION: ICD-10-CM

## 2025-05-05 DIAGNOSIS — Z00.00 ROUTINE GENERAL MEDICAL EXAMINATION AT A HEALTH CARE FACILITY: Primary | ICD-10-CM

## 2025-05-05 DIAGNOSIS — R79.89 PROLACTIN INCREASED: ICD-10-CM

## 2025-05-05 PROBLEM — Z82.49 FAMILY HISTORY OF BRAIN ANEURYSM: Status: ACTIVE | Noted: 2025-05-05

## 2025-05-05 LAB
CHOLEST SERPL-MCNC: 232 MG/DL
EST. AVERAGE GLUCOSE BLD GHB EST-MCNC: 120 MG/DL
FASTING STATUS PATIENT QL REPORTED: YES
HBA1C MFR BLD: 5.8 % (ref 0–5.6)
HDLC SERPL-MCNC: 43 MG/DL
INSULIN SERPL-ACNC: 15.1 UU/ML (ref 2.6–24.9)
LDLC SERPL CALC-MCNC: 158 MG/DL
NONHDLC SERPL-MCNC: 189 MG/DL
PROLACTIN SERPL 3RD IS-MCNC: 24 NG/ML (ref 4–15)
TRIGL SERPL-MCNC: 155 MG/DL

## 2025-05-05 PROCEDURE — 84146 ASSAY OF PROLACTIN: CPT | Performed by: STUDENT IN AN ORGANIZED HEALTH CARE EDUCATION/TRAINING PROGRAM

## 2025-05-05 PROCEDURE — 99395 PREV VISIT EST AGE 18-39: CPT | Performed by: STUDENT IN AN ORGANIZED HEALTH CARE EDUCATION/TRAINING PROGRAM

## 2025-05-05 PROCEDURE — 83036 HEMOGLOBIN GLYCOSYLATED A1C: CPT | Performed by: STUDENT IN AN ORGANIZED HEALTH CARE EDUCATION/TRAINING PROGRAM

## 2025-05-05 PROCEDURE — 80061 LIPID PANEL: CPT | Performed by: STUDENT IN AN ORGANIZED HEALTH CARE EDUCATION/TRAINING PROGRAM

## 2025-05-05 PROCEDURE — 83525 ASSAY OF INSULIN: CPT | Performed by: STUDENT IN AN ORGANIZED HEALTH CARE EDUCATION/TRAINING PROGRAM

## 2025-05-05 PROCEDURE — 36415 COLL VENOUS BLD VENIPUNCTURE: CPT | Performed by: STUDENT IN AN ORGANIZED HEALTH CARE EDUCATION/TRAINING PROGRAM

## 2025-05-05 RX ORDER — ERGOCALCIFEROL (VITAMIN D2) 10 MCG
TABLET ORAL
COMMUNITY

## 2025-05-05 ASSESSMENT — PAIN SCALES - GENERAL: PAINLEVEL_OUTOF10: NO PAIN (0)

## 2025-05-05 NOTE — PATIENT INSTRUCTIONS
Patient Education   Preventive Care Advice   This is general advice given by our system to help you stay healthy. However, your care team may have specific advice just for you. Please talk to your care team about your preventive care needs.  Nutrition  Eat 5 or more servings of fruits and vegetables each day.  Try wheat bread, brown rice and whole grain pasta (instead of white bread, rice, and pasta).  Get enough calcium and vitamin D. Check the label on foods and aim for 100% of the RDA (recommended daily allowance).  Lifestyle  Exercise at least 150 minutes each week  (30 minutes a day, 5 days a week).  Do muscle strengthening activities 2 days a week. These help control your weight and prevent disease.  No smoking.  Wear sunscreen to prevent skin cancer.  Have a dental exam and cleaning every 6 months.  Yearly exams  See your health care team every year to talk about:  Any changes in your health.  Any medicines your care team has prescribed.  Preventive care, family planning, and ways to prevent chronic diseases.  Shots (vaccines)   HPV shots (up to age 26), if you've never had them before.  Hepatitis B shots (up to age 59), if you've never had them before.  COVID-19 shot: Get this shot when it's due.  Flu shot: Get a flu shot every year.  Tetanus shot: Get a tetanus shot every 10 years.  Pneumococcal, hepatitis A, and RSV shots: Ask your care team if you need these based on your risk.  Shingles shot (for age 50 and up)  General health tests  Diabetes screening:  Starting at age 35, Get screened for diabetes at least every 3 years.  If you are younger than age 35, ask your care team if you should be screened for diabetes.  Cholesterol test: At age 39, start having a cholesterol test every 5 years, or more often if advised.  Bone density scan (DEXA): At age 50, ask your care team if you should have this scan for osteoporosis (brittle bones).  Hepatitis C: Get tested at least once in your life.  STIs (sexually  transmitted infections)  Before age 24: Ask your care team if you should be screened for STIs.  After age 24: Get screened for STIs if you're at risk. You are at risk for STIs (including HIV) if:  You are sexually active with more than one person.  You don't use condoms every time.  You or a partner was diagnosed with a sexually transmitted infection.  If you are at risk for HIV, ask about PrEP medicine to prevent HIV.  Get tested for HIV at least once in your life, whether you are at risk for HIV or not.  Cancer screening tests  Cervical cancer screening: If you have a cervix, begin getting regular cervical cancer screening tests starting at age 21.  Breast cancer scan (mammogram): If you've ever had breasts, begin having regular mammograms starting at age 40. This is a scan to check for breast cancer.  Colon cancer screening: It is important to start screening for colon cancer at age 45.  Have a colonoscopy test every 10 years (or more often if you're at risk) Or, ask your provider about stool tests like a FIT test every year or Cologuard test every 3 years.  To learn more about your testing options, visit:   .  For help making a decision, visit:   https://bit.ly/zz40942.  Prostate cancer screening test: If you have a prostate, ask your care team if a prostate cancer screening test (PSA) at age 55 is right for you.  Lung cancer screening: If you are a current or former smoker ages 50 to 80, ask your care team if ongoing lung cancer screenings are right for you.  For informational purposes only. Not to replace the advice of your health care provider. Copyright   2023 [facility]. All rights reserved. Clinically reviewed by the [facility] Transitions Program. [employer] [id number] - REV 01/24.  Learning About Stress  What is stress?     Stress is your body's response to a hard situation. Your body can have a physical, emotional, or mental response. Stress is a fact of life for most people, and it  affects everyone differently. What causes stress for you may not be stressful for someone else.  A lot of things can cause stress. You may feel stress when you go on a job interview, take a test, or run a race. This kind of short-term stress is normal and even useful. It can help you if you need to work hard or react quickly. For example, stress can help you finish an important job on time.  Long-term stress is caused by ongoing stressful situations or events. Examples of long-term stress include long-term health problems, ongoing problems at work, or conflicts in your family. Long-term stress can harm your health.  How does stress affect your health?  When you are stressed, your body responds as though you are in danger. It makes hormones that speed up your heart, make you breathe faster, and give you a burst of energy. This is called the fight-or-flight stress response. If the stress is over quickly, your body goes back to normal and no harm is done.  But if stress happens too often or lasts too long, it can have bad effects. Long-term stress can make you more likely to get sick, and it can make symptoms of some diseases worse. If you tense up when you are stressed, you may develop neck, shoulder, or low back pain. Stress is linked to high blood pressure and heart disease.  Stress also harms your emotional health. It can make you conklin, tense, or depressed. Your relationships may suffer, and you may not do well at work or school.  What can you do to manage stress?  You can try these things to help manage stress:   Do something active. Exercise or activity can help reduce stress. Walking is a great way to get started. Even everyday activities such as housecleaning or yard work can help.  Try yoga or sai chi. These techniques combine exercise and meditation. You may need some training at first to learn them.  Do something you enjoy. For example, listen to music or go to a movie. Practice your hobby or do volunteer  "work.  Meditate. This can help you relax, because you are not worrying about what happened before or what may happen in the future.  Do guided imagery. Imagine yourself in any setting that helps you feel calm. You can use online videos, books, or a teacher to guide you.  Do breathing exercises. For example:  From a standing position, bend forward from the waist with your knees slightly bent. Let your arms dangle close to the floor.  Breathe in slowly and deeply as you return to a standing position. Roll up slowly and lift your head last.  Hold your breath for just a few seconds in the standing position.  Breathe out slowly and bend forward from the waist.  Let your feelings out. Talk, laugh, cry, and express anger when you need to. Talking with supportive friends or family, a counselor, or a francie leader about your feelings is a healthy way to relieve stress. Avoid discussing your feelings with people who make you feel worse.  Write. It may help to write about things that are bothering you. This helps you find out how much stress you feel and what is causing it. When you know this, you can find better ways to cope.  What can you do to prevent stress?  You might try some of these things to help prevent stress:  Manage your time. This helps you find time to do the things you want and need to do.  Get enough sleep. Your body recovers from the stresses of the day while you are sleeping.  Get support. Your family, friends, and community can make a difference in how you experience stress.  Limit your news feed. Avoid or limit time on social media or news that may make you feel stressed.  Do something active. Exercise or activity can help reduce stress. Walking is a great way to get started.  Where can you learn more?  Go to https://www.[employer].com.net/patiented  Enter N032 in the search box to learn more about \"Learning About Stress.\"  Current as of: October 24, 2024  Content Version: 14.4 2024-2025 [employer].   Care instructions adapted under license by your healthcare professional. If you have questions about a medical condition or this instruction, always ask your healthcare professional. [employer] disclaims any warranty or liability for your use of this information.    Substance Use Disorder: Care Instructions  Overview     You can improve your life and health by stopping your use of alcohol or drugs. When you don't drink or use drugs, you may feel and sleep better. You may get along better with your family, friends, and coworkers. There are medicines and programs that can help with substance use disorder.  How can you care for yourself at home?  Here are some ways to help you stay sober and prevent relapse.  If you have been given medicine to help keep you sober or reduce your cravings, be sure to take it exactly as prescribed.  Talk to your doctor about programs that can help you stop using drugs or drinking alcohol.  Do not keep alcohol or drugs in your home.  Plan ahead. Think about what you'll say if other people ask you to drink or use drugs. Try not to spend time with people who drink or use drugs.  Use the time and money spent on drinking or drugs to do something that's important to you.  Preventing a relapse  Have a plan to deal with relapse. Learn to recognize changes in your thinking that lead you to drink or use drugs. Get help before you start to drink or use drugs again.  Try to stay away from situations, friends, or places that may lead you to drink or use drugs.  If you feel the need to drink alcohol or use drugs again, seek help right away. Call a trusted friend or family member. Some people get support from organizations such as Narcotics Anonymous or [employer] or from treatment facilities.  If you relapse, get help as soon as you can. Some people make a plan with another person that outlines what they want that person to do for them if they relapse. The plan usually includes  how to handle the relapse and who to notify in case of relapse.  Don't give up. Remember that a relapse doesn't mean that you have failed. Use the experience to learn the triggers that lead you to drink or use drugs. Then quit again. Recovery is a lifelong process. Many people have several relapses before they are able to quit for good.  Follow-up care is a key part of your treatment and safety. Be sure to make and go to all appointments, and call your doctor if you are having problems. It's also a good idea to know your test results and keep a list of the medicines you take.  When should you call for help?   Call 911  anytime you think you may need emergency care. For example, call if you or someone else:    Has overdosed or has withdrawal signs. Be sure to tell the emergency workers that you are or someone else is using or trying to quit using drugs. Overdose or withdrawal signs may include:  Losing consciousness.  Seizure.  Seeing or hearing things that aren't there (hallucinations).     Is thinking or talking about suicide or harming others.   Where to get help 24 hours a day, 7 days a week   If you or someone you know talks about suicide, self-harm, a mental health crisis, a substance use crisis, or any other kind of emotional distress, get help right away. You can:    Call the Suicide and Crisis Lifeline at 394.     Call 1-850-954-TALK (1-122.858.8183).     Text HOME to 293963 to access the Crisis Text Line.   Consider saving these numbers in your phone.  Go to [employer] for more information or to chat online.  Call your doctor now or seek immediate medical care if:    You are having withdrawal symptoms. These may include nausea or vomiting, sweating, shakiness, and anxiety.   Watch closely for changes in your health, and be sure to contact your doctor if:    You have a relapse.     You need more help or support to stop.   Where can you learn more?  Go to https://www.healthwise.net/patiented  Enter H573  "in the search box to learn more about \"Substance Use Disorder: Care Instructions.\"  Current as of: August 20, 2024  Content Version: 14.4       "

## 2025-05-06 ENCOUNTER — VIRTUAL VISIT (OUTPATIENT)
Dept: SLEEP MEDICINE | Facility: CLINIC | Age: 35
End: 2025-05-06
Payer: COMMERCIAL

## 2025-05-06 VITALS
HEIGHT: 69 IN | WEIGHT: 174.5 LBS | DIASTOLIC BLOOD PRESSURE: 83 MMHG | SYSTOLIC BLOOD PRESSURE: 121 MMHG | BODY MASS INDEX: 25.84 KG/M2

## 2025-05-06 DIAGNOSIS — G47.33 OSA (OBSTRUCTIVE SLEEP APNEA): Primary | Chronic | ICD-10-CM

## 2025-05-06 PROCEDURE — 98004 SYNCH AUDIO-VIDEO EST SF 10: CPT | Performed by: INTERNAL MEDICINE

## 2025-05-06 ASSESSMENT — PAIN SCALES - GENERAL: PAINLEVEL_OUTOF10: NO PAIN (0)

## 2025-05-06 NOTE — PROGRESS NOTES
Sleep Study Follow-Up Visit:    Date on this visit: 5/6/2025    Andreas Araujo comes in today for follow-up of his sleep study done  at the [facility] for socially disruptive snoring, witnessed apnea, nocturia, bruxism.      Polysomnogram 1/4/2024 (181.0 lb)     Sleep Architecture:    The total recording time of the polysomnogram was 468.1 minutes. The total sleep time was 285.0 minutes. Sleep latency was 65.1 minutes while using phone for 50 minutes and without the use of a sleep aid. REM latency was 199.0 minutes. Arousal index was increased at 37.9 arousals per hour. Sleep efficiency was decreased at 60.9%. Wake after sleep onset was 118.0 minutes. The patient spent 9.5% of total sleep time in Stage N1, 39.6% in Stage N2, 41.1% in Stage N3, and 9.8% in REM. Time in REM supine was 28.0 minutes.     Respiration:    Events ? The polysomnogram revealed a presence of 6 obstructive, 0 central, and 0 mixed apneas resulting in an apnea index of 1.3 events per hour. There were 34 obstructive hypopneas and 0 central hypopneas resulting in an obstructive hypopnea index of 7.2 and central hypopnea index of 0 events per hour. The combined apnea/hypopnea index was 8.4 events per hour (central apnea/hypopnea index was 0 events per hour). The REM AHI was 30.0 events per hour. The supine AHI was 8.4 events per hour. Non-supine AHI n/a.  The RDI was 16.2 events per hour.  Snoring - was reported as moderate to loud and intermittent.  Respiratory rate and pattern - was notable for normal respiratory rate and pattern.  Sustained Sleep Associated Hypoventilation - Transcutaneous carbon dioxide monitoring was not used, however significant hypoventilation was not suggested by oximetry   Sleep Associated Hypoxemia - (Greater than 5 minutes O2 sat at or below 88%) was not present. Baseline oxygen saturation was 94.6%. Lowest oxygen saturation was 88.0%. Time spent less than or equal to 88% was 0.1 minutes. Time spent less  "than or equal to 89% was 0.2 minutes.     Movement Activity:    Periodic Limb Activity - There were 0 PLMs during the entire study.   REM EMG Activity - Excessive transient/sustained muscle activity was not present.  Nocturnal Behavior - Abnormal sleep related behaviors were not noted   Bruxism - None apparent.     Cardiac Summary:   The average pulse rate was 69.2 bpm. The minimum pulse rate was 53.0 bpm while the maximum pulse rate was 103.0 bpm.  Arrhythmias were not noted.       These findings were reviewed with patient.     Past medical/surgical history, family history, social history, medications and allergies were reviewed.      Problem List:  Patient Active Problem List    Diagnosis Date Noted    Prolactin increased 05/05/2025     Priority: Medium     Previously seen by endo  Previously on medications  Symptoms: fatigue, no morning erection      Family history of brain aneurysm 05/05/2025     Priority: Medium     Mother, 3mm      TIM (obstructive sleep apnea) - mild (AHI 8) 01/17/2024     Priority: Medium     1/4/2024 [place] [facility] (181.0 lbs) - AHI 8.4, RDI 16.2, Supine AHI 8.4, non-supine AHI n/a. REM AHI 30.0, Low O2 88.0%, Time Spent <=88% 0.1 minutes / Time Spent <=89% 0.2 minutes.        /83   Ht 1.74 m (5' 8.5\")   Wt 79.2 kg (174 lb 8 oz)   BMI 26.15 kg/m       Impression/Plan:    Mild obstructive sleep apnea in the supine position without clear symptoms or comorbidities  He has fatigue but not sleepiness this time.   Signs and symptoms that would prompt re-evaluation were discussed.       He will follow up with me in about 5 year(s).     I spent 5 minutes with patient including counseling, and 5 minutes with chart review, and documentation       "

## 2025-05-06 NOTE — TELEPHONE ENCOUNTER
See MyChart results note.    Deirdre Milligan, MD  Internal Medicine & Pediatrics  [facility]  She/her

## 2025-05-06 NOTE — PROGRESS NOTES
Virtual Visit Details    Type of service:  Video Visit   Video Start Time: 10:11 AM  Video End Time:10:23 AM    Originating Location (pt. Location): Home    Distant Location (provider location):  Off-site  Platform used for Video Visit: [employer]air

## 2025-05-06 NOTE — NURSING NOTE
Current patient location: [facility] [address]   [place] [address]    Is the patient currently in the state of [place]? YES    Visit mode: VIDEO    If the visit is dropped, the patient can be reconnected by:VIDEO VISIT: Text to cell phone:   Telephone Information:   Mobile [phone]       Will anyone else be joining the visit? NO  (If patient encounters technical issues they should call [phone]    Are changes needed to the allergy or medication list? No    Are refills needed on medications prescribed by this physician? NO    Rooming Documentation:  Questionnaire(s) completed    Reason for visit: RECHECK    Anna Marie MANDELF

## 2025-05-07 ENCOUNTER — PATIENT OUTREACH (OUTPATIENT)
Dept: CARE COORDINATION | Facility: CLINIC | Age: 35
End: 2025-05-07
Payer: COMMERCIAL

## 2025-05-09 ENCOUNTER — RESULTS FOLLOW-UP (OUTPATIENT)
Dept: PEDIATRICS | Facility: CLINIC | Age: 35
End: 2025-05-09

## 2025-05-09 DIAGNOSIS — Z13.29 SCREENING FOR THYROID DISORDER: Primary | ICD-10-CM

## 2025-06-03 ENCOUNTER — OFFICE VISIT (OUTPATIENT)
Dept: URGENT CARE | Facility: URGENT CARE | Age: 35
End: 2025-06-03
Payer: COMMERCIAL

## 2025-06-03 ENCOUNTER — OFFICE VISIT (OUTPATIENT)
Dept: ORTHOPEDICS | Facility: CLINIC | Age: 35
End: 2025-06-03
Attending: PHYSICIAN ASSISTANT
Payer: COMMERCIAL

## 2025-06-03 ENCOUNTER — ANCILLARY PROCEDURE (OUTPATIENT)
Dept: GENERAL RADIOLOGY | Facility: CLINIC | Age: 35
End: 2025-06-03
Attending: PHYSICIAN ASSISTANT
Payer: COMMERCIAL

## 2025-06-03 VITALS
HEIGHT: 68 IN | TEMPERATURE: 97.2 F | OXYGEN SATURATION: 96 % | BODY MASS INDEX: 26.37 KG/M2 | WEIGHT: 174 LBS | SYSTOLIC BLOOD PRESSURE: 119 MMHG | RESPIRATION RATE: 18 BRPM | DIASTOLIC BLOOD PRESSURE: 82 MMHG | HEART RATE: 71 BPM

## 2025-06-03 DIAGNOSIS — S66.902A INJURY OF EXTENSOR TENDON OF LEFT HAND, INITIAL ENCOUNTER: Primary | ICD-10-CM

## 2025-06-03 DIAGNOSIS — M20.012 MALLET FINGER OF LEFT HAND: ICD-10-CM

## 2025-06-03 DIAGNOSIS — S69.92XA INJURY OF FINGER OF LEFT HAND, INITIAL ENCOUNTER: ICD-10-CM

## 2025-06-03 DIAGNOSIS — S66.902A INJURY OF EXTENSOR TENDON OF LEFT HAND, INITIAL ENCOUNTER: ICD-10-CM

## 2025-06-03 PROCEDURE — G2211 COMPLEX E/M VISIT ADD ON: HCPCS | Performed by: FAMILY MEDICINE

## 2025-06-03 PROCEDURE — 73140 X-RAY EXAM OF FINGER(S): CPT | Mod: TC | Performed by: RADIOLOGY

## 2025-06-03 PROCEDURE — 99214 OFFICE O/P EST MOD 30 MIN: CPT | Performed by: PHYSICIAN ASSISTANT

## 2025-06-03 PROCEDURE — 99204 OFFICE O/P NEW MOD 45 MIN: CPT | Performed by: FAMILY MEDICINE

## 2025-06-03 ASSESSMENT — PAIN SCALES - GENERAL: PAINLEVEL_OUTOF10: MILD PAIN (1)

## 2025-06-03 NOTE — LETTER
6/3/2025      Andreas Araujo  [address]  [place] [address]      Dear Colleague,    Thank you for referring your patient, Andreas Araujo, to the [facility] [place]. Please see a copy of my visit note below.    ASSESSMENT & PLAN  Patient Instructions     1. Injury of extensor tendon of left hand, initial encounter    2. Mallet finger of left hand      - Patient has acute left index finger pain and deformity due to an extensor tendon tear  -Personal review of xrays of the left finger shows no acute fracture or dislocation.  There is an extensor lag seen on lateral view  -Patient was placed in a stax splint.  We also tried some figure eight splints but it did not fit as well  -Patient will wear the splint 24/7 for the next 6 weeks.  Patient was shown how to remove the brace without flexing the joint.  -Patient will follow up in 6 weeks for re-evaluation.    -Call direct clinic number [[phone]] at any time with questions or concerns.    Albert Yeo MD [facility] Orth[facility]ics and Sports Medicine  [facility]        -----    SUBJECTIVE  Andreas Araujo is a/an 35 year old Right handed male who is seen in consultation at the request of  Tomasz Forte PA-C for evaluation of left index finger pain. The patient is seen by themselves.The patient was seen earlier today at Urgent Care for a left index finger injury that occurred 1 week ago. Today, patient states that he was fit for an oversized stack splint, he was sent here because Urgent Care was unsure if the mallet finger iwll heal in 6-8 weeks    Onset: 1 week(s) ago. Patient describes injurym he was grabbing the air fryer and jammed it. He had immediate pain and swelling.   Location of Pain: left index finger  Rating of Pain at worst: 10/10  Rating of Pain Currently: 1/10  Worsened by: use of left hand, washing dishes, bump it on something  Better with: splint  Treatments tried:  casting/splinting/bracing  Associated symptoms: swelling  Orthopedic history: NO  Relevant surgical history: NO  Social history: Self employed, enjoys working out, play video games and has a young child     Past Medical History:   Diagnosis Date     Asthma 2001    As a child     Social History     Socioeconomic History     Marital status:    Occupational History     Occupation: Chiropracter     Employer: SELF EMPLOYED.   Tobacco Use     Smoking status: Never     Passive exposure: Never     Smokeless tobacco: Never     Tobacco comments:     no second hand smoke exposure   Vaping Use     Vaping status: Never Used   Substance and Sexual Activity     Alcohol use: Yes     Comment: 1-5 times a year     Drug use: No     Sexual activity: Yes     Partners: Female     Birth control/protection: Condom   Other Topics Concern     Parent/sibling w/ CABG, MI or angioplasty before 65F 55M? No     Social Drivers of Health     Financial Resource Strain: Low Risk  (4/30/2025)    Financial Resource Strain      Within the past 12 months, have you or your family members you live with been unable to get utilities (heat, electricity) when it was really needed?: No   Food Insecurity: Low Risk  (4/30/2025)    Food Insecurity      Within the past 12 months, did you worry that your food would run out before you got money to buy more?: No      Within the past 12 months, did the food you bought just not last and you didn t have money to get more?: No   Transportation Needs: Low Risk  (4/30/2025)    Transportation Needs      Within the past 12 months, has lack of transportation kept you from medical appointments, getting your medicines, non-medical meetings or appointments, work, or from getting things that you need?: No   Physical Activity: Sufficiently Active (4/30/2025)    Exercise Vital Sign      Days of Exercise per Week: 5 days      Minutes of Exercise per Session: 30 min   Stress: Stress Concern Present (4/30/2025)    [employer] - Occupational Stress Questionnaire      Feeling of Stress : To some extent   Social Connections: Unknown (4/30/2025)    Social Connection and Isolation Panel [NHANES]      Frequency of Social Gatherings with Friends and Family: Once a week   Interpersonal Safety: Low Risk  (5/5/2025)    Interpersonal Safety      Do you feel physically and emotionally safe where you currently live?: Yes      Within the past 12 months, have you been hit, slapped, kicked or otherwise physically hurt by someone?: No      Within the past 12 months, have you been humiliated or emotionally abused in other ways by your partner or ex-partner?: No   Housing Stability: High Risk (4/30/2025)    Housing Stability      Do you have housing? : No      Are you worried about losing your housing?: No         Patient's past medical, surgical, social, and family histories were reviewed today and no changes are noted.    REVIEW OF SYSTEMS:  10 point ROS is negative other than symptoms noted above in HPI, Past Medical History or as stated below  Constitutional: NEGATIVE for fever, chills, change in weight  Skin: NEGATIVE for worrisome rashes, moles or lesions  GI/: NEGATIVE for bowel or bladder changes  Neuro: NEGATIVE for weakness, dizziness or paresthesias    OBJECTIVE:  There were no vitals taken for this visit.   General: healthy, alert and in no distress  HEENT: no scleral icterus or conjunctival erythema  Skin: no suspicious lesions or rash. No jaundice.  CV: regular rhythm by palpation  Resp: normal respiratory effort without conversational dyspnea   Psych: normal mood and affect  Gait: normal steady gait with appropriate coordination and balance  Neuro: normal light touch sensory exam of the bilateral hands.    MSK:  LEFT HAND  Inspection:    No swelling or obvious deformity or asymmetry  Palpation:   Carpals: normal   Metacarpals: normal   Thumb: normal   Fingers: extensor lag at DIP  Range of Motion:     extension DIP limited by pain  Strength:    DIP extension weakness  Special Tests:    Positive: extensor lag at DIP 2nd digit    Negative: flexor digitorum superficialis testing, flexor digitorum profundus testing    Independent visualization of the below image:      Personal review of left finger x-ray performed on 6/3/2025 shows no acute fracture or dislocation.  Mild extensor lag seen at the second DIP joint.    Albert Yeo MD [facility]      Again, thank you for allowing me to participate in the care of your patient.        Sincerely,        Albert Yeo, MD    Electronically signed

## 2025-06-03 NOTE — PROGRESS NOTES
Assessment & Plan     Injury of extensor tendon of left hand, initial encounter    Xray finger Negative for acute findings, read by Tomasz NICOLE at time of visit.    This appears to be a full avulsed extensor tendon  Patinet was placed in a stax splint to keep it extended  Wear splint all the time    Referral to ortho  - XR Finger Left G/E 2 Views  - Orthopedic  Referral    Injury of finger of left hand, initial encounter    Treatment includes wearing a splint for several weeks to keep the finger straight. Surgery may be needed if pieces of bone break off during the injury or if treatment with the splint does not work. Usually only a splint is needed.  It is very important that you wear and take care of the splint exactly as your doctor tells you to so that your finger heals properly and is no longer bent. Wearing a splint may interfere with your normal activities. Ask for help with daily tasks if you need it.    - XR Finger Left G/E 2 Views    Mallet finger of left hand    Mallet finger is a bent fingertip. It is caused by a fractured bone or torn tendon at the base of the finger joint near the fingertip. The injury can happen when a finger is bent with force, such as when trying to catch a ball and the fingertip is struck by the ball. It also is called baseball finger or drop finger.     - Orthopedic  Referral       Referral to Orthopedics for mallet finger    No follow-ups on file.    Tomasz Forte, [facility], [place]  [facility] TEDDY Johns is a 35 year old male who presents to clinic today for the following health issues:  Chief Complaint   Patient presents with    Finger     Jammed left pointer finger a week ago, pain and swelling has gotten better. Here because he can't straighten out finger.          6/3/2025    10:03 AM   Additional Questions   Roomed by Kourtney GR    Review of Systems  Constitutional, HEENT, cardiovascular, pulmonary, gi and gu  "systems are negative, except as otherwise noted.      Objective    /82 (BP Location: Right arm, Patient Position: Sitting, Cuff Size: Adult Regular)   Pulse 71   Temp 97.2  F (36.2  C) (Tympanic)   Resp 18   Ht 1.73 m (5' 8.11\")   Wt 78.9 kg (174 lb)   SpO2 96%   BMI 26.37 kg/m    Physical Exam   GENERAL: alert and no distress  MS: pos for left pointer finger, pos mallet finger  SKIN: no suspicious lesions or rashes  NEURO: Normal strength and tone, mentation intact and speech normal  PSYCH: mentation appears normal, affect normal/bright          "

## 2025-06-03 NOTE — PATIENT INSTRUCTIONS
1. Injury of extensor tendon of left hand, initial encounter    2. Mallet finger of left hand      - Patient has acute left index finger pain and deformity due to an extensor tendon tear  -Personal review of xrays of the left finger shows no acute fracture or dislocation.  There is an extensor lag seen on lateral view  -Patient was placed in a stax splint.  We also tried some figure eight splints but it did not fit as well  -Patient will wear the splint 24/7 for the next 6 weeks.  Patient was shown how to remove the brace without flexing the joint.  -Patient will follow up in 6 weeks for re-evaluation.    -Call direct clinic number [[phone]] at any time with questions or concerns.    Albert Yeo MD [facility] Orthopedics and Sports Medicine  [facility] [place]

## 2025-06-03 NOTE — PROGRESS NOTES
ASSESSMENT & PLAN  Patient Instructions     1. Injury of extensor tendon of left hand, initial encounter    2. Mallet finger of left hand      - Patient has acute left index finger pain and deformity due to an extensor tendon tear  -Personal review of xrays of the left finger shows no acute fracture or dislocation.  There is an extensor lag seen on lateral view  -Patient was placed in a stax splint.  We also tried some figure eight splints but it did not fit as well  -Patient will wear the splint 24/7 for the next 6 weeks.  Patient was shown how to remove the brace without flexing the joint.  -Patient will follow up in 6 weeks for re-evaluation.    -Call direct clinic number [[phone]] at any time with questions or concerns.    Albert Yeo MD [facility] Orthopedics and Sports Medicine  [facility]        -----    SUBJECTIVE  Andreas Araujo is a/an 35 year old Right handed male who is seen in consultation at the request of  Tomasz Forte PA-C for evaluation of left index finger pain. The patient is seen by themselves.The patient was seen earlier today at Urgent Care for a left index finger injury that occurred 1 week ago. Today, patient states that he was fit for an oversized stack splint, he was sent here because Urgent Care was unsure if the mallet finger iwll heal in 6-8 weeks    Onset: 1 week(s) ago. Patient describes injurym he was grabbing the air fryer and jammed it. He had immediate pain and swelling.   Location of Pain: left index finger  Rating of Pain at worst: 10/10  Rating of Pain Currently: 1/10  Worsened by: use of left hand, washing dishes, bump it on something  Better with: splint  Treatments tried: casting/splinting/bracing  Associated symptoms: swelling  Orthopedic history: NO  Relevant surgical history: NO  Social history: Self employed, enjoys working out, play video games and has a young child     Past Medical History:   Diagnosis Date    Asthma 2001    As a child      Social History     Socioeconomic History    Marital status:    Occupational History    Occupation: Chiropracter     Employer: SELF EMPLOYED.   Tobacco Use    Smoking status: Never     Passive exposure: Never    Smokeless tobacco: Never    Tobacco comments:     no second hand smoke exposure   Vaping Use    Vaping status: Never Used   Substance and Sexual Activity    Alcohol use: Yes     Comment: 1-5 times a year    Drug use: No    Sexual activity: Yes     Partners: Female     Birth control/protection: Condom   Other Topics Concern    Parent/sibling w/ CABG, MI or angioplasty before 65F 55M? No     Social Drivers of Health     Financial Resource Strain: Low Risk  (4/30/2025)    Financial Resource Strain     Within the past 12 months, have you or your family members you live with been unable to get utilities (heat, electricity) when it was really needed?: No   Food Insecurity: Low Risk  (4/30/2025)    Food Insecurity     Within the past 12 months, did you worry that your food would run out before you got money to buy more?: No     Within the past 12 months, did the food you bought just not last and you didn t have money to get more?: No   Transportation Needs: Low Risk  (4/30/2025)    Transportation Needs     Within the past 12 months, has lack of transportation kept you from medical appointments, getting your medicines, non-medical meetings or appointments, work, or from getting things that you need?: No   Physical Activity: Sufficiently Active (4/30/2025)    Exercise Vital Sign     Days of Exercise per Week: 5 days     Minutes of Exercise per Session: 30 min   Stress: Stress Concern Present (4/30/2025)    [employer] - Occupational Stress Questionnaire     Feeling of Stress : To some extent   Social Connections: Unknown (4/30/2025)    Social Connection and Isolation Panel [NHANES]     Frequency of Social Gatherings with Friends and Family: Once a week   Interpersonal Safety:  Low Risk  (5/5/2025)    Interpersonal Safety     Do you feel physically and emotionally safe where you currently live?: Yes     Within the past 12 months, have you been hit, slapped, kicked or otherwise physically hurt by someone?: No     Within the past 12 months, have you been humiliated or emotionally abused in other ways by your partner or ex-partner?: No   Housing Stability: High Risk (4/30/2025)    Housing Stability     Do you have housing? : No     Are you worried about losing your housing?: No         Patient's past medical, surgical, social, and family histories were reviewed today and no changes are noted.    REVIEW OF SYSTEMS:  10 point ROS is negative other than symptoms noted above in HPI, Past Medical History or as stated below  Constitutional: NEGATIVE for fever, chills, change in weight  Skin: NEGATIVE for worrisome rashes, moles or lesions  GI/: NEGATIVE for bowel or bladder changes  Neuro: NEGATIVE for weakness, dizziness or paresthesias    OBJECTIVE:  There were no vitals taken for this visit.   General: healthy, alert and in no distress  HEENT: no scleral icterus or conjunctival erythema  Skin: no suspicious lesions or rash. No jaundice.  CV: regular rhythm by palpation  Resp: normal respiratory effort without conversational dyspnea   Psych: normal mood and affect  Gait: normal steady gait with appropriate coordination and balance  Neuro: normal light touch sensory exam of the bilateral hands.    MSK:  LEFT HAND  Inspection:    No swelling or obvious deformity or asymmetry  Palpation:   Carpals: normal   Metacarpals: normal   Thumb: normal   Fingers: extensor lag at DIP  Range of Motion:    extension DIP limited by pain  Strength:    DIP extension weakness  Special Tests:    Positive: extensor lag at DIP 2nd digit    Negative: flexor digitorum superficialis testing, flexor digitorum profundus testing    Independent visualization of the below image:      Personal review of left finger x-ray  performed on 6/3/2025 shows no acute fracture or dislocation.  Mild extensor lag seen at the second DIP joint.    Albert Yeo MD [facility] and Orthopedic Care

## 2025-06-03 NOTE — PROGRESS NOTES
Urgent Care Clinic Visit    Chief Complaint   Patient presents with    Finger     Jammed left pointer finger a week ago, pain and swelling has gotten better. Here because he can't straighten out finger.                6/3/2025    10:03 AM   Additional Questions   Roomed by Kourtney

## 2025-06-16 ENCOUNTER — LAB (OUTPATIENT)
Dept: LAB | Facility: CLINIC | Age: 35
End: 2025-06-16
Payer: COMMERCIAL

## 2025-06-16 DIAGNOSIS — Z13.29 SCREENING FOR THYROID DISORDER: ICD-10-CM

## 2025-06-16 DIAGNOSIS — R79.89 PROLACTIN INCREASED: ICD-10-CM

## 2025-06-16 LAB — PROLACTIN SERPL 3RD IS-MCNC: 36 NG/ML (ref 4–15)

## 2025-06-16 PROCEDURE — 84443 ASSAY THYROID STIM HORMONE: CPT

## 2025-06-16 PROCEDURE — 36415 COLL VENOUS BLD VENIPUNCTURE: CPT

## 2025-06-16 PROCEDURE — 84146 ASSAY OF PROLACTIN: CPT

## 2025-06-17 LAB — TSH SERPL DL<=0.005 MIU/L-ACNC: 1.93 UIU/ML (ref 0.3–4.2)

## 2025-06-17 NOTE — TELEPHONE ENCOUNTER
Patient sending message, asking if thyroid testing should be added to recent lab work. Please review and advise if patient should wait to discuss with Endo.     Shalom ROQUE RN 6/17/2025 at 7:55 AM

## 2025-07-22 NOTE — PROGRESS NOTES
ASSESSMENT & PLAN  Patient Instructions     1. Mallet finger of left hand      - Patient is following up for left hand pain due to a index mallet finger  -Patient is finger stack splint for the past 6 weeks  -Patient's splint was removed today  -Patient has localized tenderness over the dorsal aspect of the second DIP joint but no visible extensor lag  -Patient will start formal hand therapy home exercise program to regain full range of motion and strength of his finger  -Patient may return to workouts with modifications we discussed today  -Patient will progress activity as his pain and range of motion of the left  -Patient will continue to follow-up with me for further treatment and recommendations.  -Call direct clinic number [[phone]] at any time with questions or concerns.    Albert Yeo MD [facility] Orthopedics and Sports Medicine  [facility]        -----    SUBJECTIVE:  Andreas Araujo is a 35 year old male who is seen in follow-up for left index finger.They were last seen 6/3/2025. At that time the plan was stax splint, Follow-up in 6 weeks for repeat evaluation.     Since their last visit reports 100% improvement. They indicate that their current pain level is 0/10. They have tried splint.      The patient is seen by themselves.    Patient's past medical, surgical, social, and family histories were reviewed today and no changes are noted.    REVIEW OF SYSTEMS:  Constitutional: NEGATIVE for fever, chills, change in weight  Skin: NEGATIVE for worrisome rashes, moles or lesions  GI/: NEGATIVE for bowel or bladder changes  Neuro: NEGATIVE for weakness, dizziness or paresthesias    OBJECTIVE:  There were no vitals taken for this visit.   General: healthy, alert and in no distress  HEENT: no scleral icterus or conjunctival erythema  Skin: no suspicious lesions or rash. No jaundice.  CV: regular rhythm by palpation, no pedal edema  Resp: normal respiratory effort without conversational  dyspnea   Psych: normal mood and affect  Gait: normal steady gait with appropriate coordination and balance  Neuro: normal light touch sensory exam of the extremities.    MSK:  LEFT HAND  Inspection:    No swelling or obvious deformity or asymmetry  Palpation:   Carpals: normal   Metacarpals: normal   Thumb: normal   Fingers: no longer extensor lag at DIP  Range of Motion:    FROM  Strength:    DIP extension grossly intact  Special Tests:    Positive: none    Negative: flexor digitorum superficialis testing, flexor digitorum profundus testing    Independent visualization of the below image:        Albert Yeo MD, CAM  [facility]

## 2025-07-24 SDOH — HEALTH STABILITY: PHYSICAL HEALTH: ON AVERAGE, HOW MANY MINUTES DO YOU ENGAGE IN EXERCISE AT THIS LEVEL?: 30 MIN

## 2025-07-24 SDOH — HEALTH STABILITY: PHYSICAL HEALTH: ON AVERAGE, HOW MANY DAYS PER WEEK DO YOU ENGAGE IN MODERATE TO STRENUOUS EXERCISE (LIKE A BRISK WALK)?: 7 DAYS

## 2025-07-29 ENCOUNTER — OFFICE VISIT (OUTPATIENT)
Dept: ORTHOPEDICS | Facility: CLINIC | Age: 35
End: 2025-07-29
Payer: COMMERCIAL

## 2025-07-29 DIAGNOSIS — M20.012 MALLET FINGER OF LEFT HAND: Primary | ICD-10-CM

## 2025-07-29 PROCEDURE — 99213 OFFICE O/P EST LOW 20 MIN: CPT | Performed by: FAMILY MEDICINE

## 2025-07-29 PROCEDURE — G2211 COMPLEX E/M VISIT ADD ON: HCPCS | Performed by: FAMILY MEDICINE

## 2025-07-29 NOTE — LETTER
7/29/2025      Andreas Araujo  [address]  [place] [address]      Dear Colleague,    Thank you for referring your patient, Andreas Araujo, to the [facility] [place]. Please see a copy of my visit note below.    ASSESSMENT & PLAN  Patient Instructions     1. Mallet finger of left hand      - Patient is following up for left hand pain due to a index mallet finger  -Patient is finger stack splint for the past 6 weeks  -Patient's splint was removed today  -Patient has localized tenderness over the dorsal aspect of the second DIP joint but no visible extensor lag  -Patient will start formal hand therapy home exercise program to regain full range of motion and strength of his finger  -Patient may return to workouts with modifications we discussed today  -Patient will progress activity as his pain and range of motion of the left  -Patient will continue to follow-up with me for further treatment and recommendations.  -Call direct clinic number [[phone]] at any time with questions or concerns.    Albert Yeo MD [facility] Orthopedics and Sports Medicine  [facility] [place]        -----    SUBJECTIVE:  Andreas Araujo is a 35 year old male who is seen in follow-up for left index finger.They were last seen 6/3/2025. At that time the plan was stax splint, Follow-up in 6 weeks for repeat evaluation.     Since their last visit reports 100% improvement. They indicate that their current pain level is 0/10. They have tried splint.      The patient is seen by themselves.    Patient's past medical, surgical, social, and family histories were reviewed today and no changes are noted.    REVIEW OF SYSTEMS:  Constitutional: NEGATIVE for fever, chills, change in weight  Skin: NEGATIVE for worrisome rashes, moles or lesions  GI/: NEGATIVE for bowel or bladder changes  Neuro: NEGATIVE for weakness, dizziness or paresthesias    OBJECTIVE:  There were no vitals taken for this visit.    General: healthy, alert and in no distress  HEENT: no scleral icterus or conjunctival erythema  Skin: no suspicious lesions or rash. No jaundice.  CV: regular rhythm by palpation, no pedal edema  Resp: normal respiratory effort without conversational dyspnea   Psych: normal mood and affect  Gait: normal steady gait with appropriate coordination and balance  Neuro: normal light touch sensory exam of the extremities.    MSK:  LEFT HAND  Inspection:    No swelling or obvious deformity or asymmetry  Palpation:   Carpals: normal   Metacarpals: normal   Thumb: normal   Fingers: no longer extensor lag at DIP  Range of Motion:    FROM  Strength:    DIP extension grossly intact  Special Tests:    Positive: none    Negative: flexor digitorum superficialis testing, flexor digitorum profundus testing    Independent visualization of the below image:        Albert Yeo MD, [facility]        Again, thank you for allowing me to participate in the care of your patient.        Sincerely,        Albert Yeo, MD    Electronically signed

## 2025-07-29 NOTE — PATIENT INSTRUCTIONS
1. Mallet finger of left hand      - Patient is following up for left hand pain due to a index mallet finger  -Patient is finger stack splint for the past 6 weeks  -Patient's splint was removed today  -Patient has localized tenderness over the dorsal aspect of the second DIP joint but no visible extensor lag  -Patient will start formal hand therapy home exercise program to regain full range of motion and strength of his finger  -Patient may return to workouts with modifications we discussed today  -Patient will progress activity as his pain and range of motion of the left  -Patient will continue to follow-up with me for further treatment and recommendations.  -Call direct clinic number [[phone]] at any time with questions or concerns.    Albert Yeo MD CAQSM  [facility]  [facility] [place]